# Patient Record
Sex: FEMALE | HISPANIC OR LATINO | Employment: FULL TIME | URBAN - METROPOLITAN AREA
[De-identification: names, ages, dates, MRNs, and addresses within clinical notes are randomized per-mention and may not be internally consistent; named-entity substitution may affect disease eponyms.]

---

## 2017-10-27 ENCOUNTER — NON-PROVIDER VISIT (OUTPATIENT)
Dept: OCCUPATIONAL MEDICINE | Facility: CLINIC | Age: 22
End: 2017-10-27

## 2017-10-27 DIAGNOSIS — Z23 NEED FOR VACCINATION: ICD-10-CM

## 2017-10-27 PROCEDURE — 90746 HEPB VACCINE 3 DOSE ADULT IM: CPT | Performed by: PREVENTIVE MEDICINE

## 2017-12-19 ENCOUNTER — HOSPITAL ENCOUNTER (OUTPATIENT)
Facility: MEDICAL CENTER | Age: 22
End: 2017-12-19
Attending: FAMILY MEDICINE
Payer: COMMERCIAL

## 2017-12-19 ENCOUNTER — OFFICE VISIT (OUTPATIENT)
Dept: URGENT CARE | Facility: PHYSICIAN GROUP | Age: 22
End: 2017-12-19
Payer: COMMERCIAL

## 2017-12-19 VITALS
HEART RATE: 73 BPM | RESPIRATION RATE: 16 BRPM | HEIGHT: 67 IN | DIASTOLIC BLOOD PRESSURE: 82 MMHG | BODY MASS INDEX: 26.37 KG/M2 | SYSTOLIC BLOOD PRESSURE: 122 MMHG | WEIGHT: 168 LBS | OXYGEN SATURATION: 100 % | TEMPERATURE: 98.7 F

## 2017-12-19 DIAGNOSIS — R10.9 ABDOMINAL PAIN, UNSPECIFIED ABDOMINAL LOCATION: ICD-10-CM

## 2017-12-19 DIAGNOSIS — N39.0 COMPLICATED UTI (URINARY TRACT INFECTION): ICD-10-CM

## 2017-12-19 LAB
APPEARANCE UR: CLEAR
BILIRUB UR STRIP-MCNC: NORMAL MG/DL
COLOR UR AUTO: YELLOW
GLUCOSE UR STRIP.AUTO-MCNC: NORMAL MG/DL
INT CON NEG: NEGATIVE
INT CON POS: POSITIVE
KETONES UR STRIP.AUTO-MCNC: NORMAL MG/DL
LEUKOCYTE ESTERASE UR QL STRIP.AUTO: NORMAL
NITRITE UR QL STRIP.AUTO: NORMAL
PH UR STRIP.AUTO: 6.5 [PH] (ref 5–8)
POC URINE PREGNANCY TEST: NORMAL
PROT UR QL STRIP: NORMAL MG/DL
RBC UR QL AUTO: NORMAL
SP GR UR STRIP.AUTO: 1.01
UROBILINOGEN UR STRIP-MCNC: NORMAL MG/DL

## 2017-12-19 PROCEDURE — 87186 SC STD MICRODIL/AGAR DIL: CPT

## 2017-12-19 PROCEDURE — 81025 URINE PREGNANCY TEST: CPT | Performed by: FAMILY MEDICINE

## 2017-12-19 PROCEDURE — 81002 URINALYSIS NONAUTO W/O SCOPE: CPT | Performed by: FAMILY MEDICINE

## 2017-12-19 PROCEDURE — 87086 URINE CULTURE/COLONY COUNT: CPT

## 2017-12-19 PROCEDURE — 87077 CULTURE AEROBIC IDENTIFY: CPT

## 2017-12-19 PROCEDURE — 99204 OFFICE O/P NEW MOD 45 MIN: CPT | Performed by: FAMILY MEDICINE

## 2017-12-19 RX ORDER — CIPROFLOXACIN 500 MG/1
500 TABLET, FILM COATED ORAL EVERY 12 HOURS
Qty: 14 TAB | Refills: 0 | Status: SHIPPED | OUTPATIENT
Start: 2017-12-19 | End: 2017-12-26

## 2017-12-19 ASSESSMENT — ENCOUNTER SYMPTOMS
SORE THROAT: 0
MYALGIAS: 1
ABDOMINAL PAIN: 1
SHORTNESS OF BREATH: 0
DIZZINESS: 0
NAUSEA: 1
PSYCHIATRIC NEGATIVE: 1
EYE REDNESS: 0
COUGH: 0
CHILLS: 1
FEVER: 0
BRUISES/BLEEDS EASILY: 0
VOMITING: 0

## 2017-12-20 DIAGNOSIS — N39.0 COMPLICATED UTI (URINARY TRACT INFECTION): ICD-10-CM

## 2017-12-20 NOTE — PROGRESS NOTES
Subjective:      Tanisha Cole is a 22 y.o. female who presents with Abdominal Pain (pain in center of abd x1 month)  Patient presents urgent care with suprapubic abdominal pain for the past month off and on. She's had dysuria urgency frequency. That has been off and on. Denies any hematuria. She's had some nausea some malaise and body aches and some chills no obvious fevers. No history of kidney infection or kidney stone. No vaginal discharge. She's had some right low back and flank region pain.      HPI  Review of Systems   Constitutional: Positive for chills and malaise/fatigue. Negative for fever.   HENT: Negative for sore throat.    Eyes: Negative for redness.   Respiratory: Negative for cough and shortness of breath.    Cardiovascular: Negative for chest pain.   Gastrointestinal: Positive for abdominal pain and nausea. Negative for vomiting.   Genitourinary: Positive for dysuria, frequency and urgency.   Musculoskeletal: Positive for myalgias.   Neurological: Negative for dizziness.   Endo/Heme/Allergies: Does not bruise/bleed easily.   Psychiatric/Behavioral: Negative.      PMH:  has no past medical history of Addisons disease (CMS-HCC); Adrenal disorder (CMS-HCC); Allergy; Anemia; Anxiety; Arrhythmia; Arthritis; ASTHMA; Blood transfusion; Cancer (CMS-HCC); CATARACT; CHF (congestive heart failure) (CMS-HCC); Clotting disorder (CMS-HCC); COPD; Cushings syndrome (CMS-HCC); Depression; Diabetes; Diabetic neuropathy (CMS-HCC); EMPHYSEMA; GERD (gastroesophageal reflux disease); Glaucoma; Goiter; Headache(784.0); Heart attack; Heart murmur; HIV (human immunodeficiency virus infection); Hyperlipidemia; Hypertension; IBD (inflammatory bowel disease); Kidney disease; Meningitis; Migraine; Muscle disorder; OSTEOPOROSIS; Parathyroid disorder (CMS-HCC); Pituitary disease (CMS-HCC); Seizure (CMS-HCC); Sickle cell disease (CMS-HCC); Stroke (CMS-HCC); Substance abuse; Thyroid disease; Tuberculosis; Ulcer (CMS-HCC); or  "Urinary tract infection, site not specified.  MEDS:   Current Outpatient Prescriptions:   •  ciprofloxacin (CIPRO) 500 MG Tab, Take 1 Tab by mouth every 12 hours for 7 days., Disp: 14 Tab, Rfl: 0  •  norgestimate-ethinyl estradiol (ORTHO-CYCLEN) 0.25-35 MG-MCG per tablet, Take 1 Tab by mouth every day., Disp: 28 Tab, Rfl: 11  •  Prenatal MV-Min-Fe Fum-FA-DHA (PRENATAL 1 PO), Take  by mouth., Disp: , Rfl:   ALLERGIES:   Allergies   Allergen Reactions   • Penicillins Rash     rash   SURGHX: History reviewed. No pertinent surgical history.  SOCHX:  reports that she has never smoked. She has never used smokeless tobacco. She reports that she does not drink alcohol or use drugs.  FH: Family history was reviewed, no pertinent findings to report     Objective:     /82   Pulse 73   Temp 37.1 °C (98.7 °F)   Resp 16   Ht 1.702 m (5' 7\")   Wt 76.2 kg (168 lb)   LMP 11/28/2017   SpO2 100%   Breastfeeding? No   BMI 26.31 kg/m²      Physical Exam   Constitutional: She appears well-developed and well-nourished. No distress.   HENT:   Mouth/Throat: Oropharynx is clear and moist.   Eyes: Conjunctivae are normal.   Neck: Normal range of motion.   Cardiovascular: Normal rate, regular rhythm, normal heart sounds and intact distal pulses.  Exam reveals no gallop and no friction rub.    No murmur heard.  Pulmonary/Chest: Effort normal and breath sounds normal. No respiratory distress. She has no wheezes. She has no rales. She exhibits no tenderness.   Abdominal: Soft. There is CVA tenderness.   Musculoskeletal: Normal range of motion.        Arms:  Neurological: She is alert.   Skin: Skin is warm and dry. She is not diaphoretic.   Psychiatric: She has a normal mood and affect. Her behavior is normal.        Diagnostics: Urine hCG is negative urine dip shows RBCs and WBCs       Assessment/Plan:     1. Abdominal pain, unspecified abdominal location  POCT Urinalysis    POCT PREGNANCY   2. Complicated UTI (urinary tract " infection)  ciprofloxacin (CIPRO) 500 MG Tab    Urine Culture     Follow-up with primary care provider within 4-5 days, emergency room precautions discussed.  Patient and/or family appears understanding of information.

## 2017-12-22 LAB
BACTERIA UR CULT: ABNORMAL
BACTERIA UR CULT: ABNORMAL
SIGNIFICANT IND 70042: ABNORMAL
SITE SITE: ABNORMAL
SOURCE SOURCE: ABNORMAL

## 2020-08-23 ENCOUNTER — APPOINTMENT (OUTPATIENT)
Dept: RADIOLOGY | Facility: IMAGING CENTER | Age: 25
End: 2020-08-23
Attending: FAMILY MEDICINE
Payer: MEDICAID

## 2020-08-23 ENCOUNTER — OFFICE VISIT (OUTPATIENT)
Dept: URGENT CARE | Facility: CLINIC | Age: 25
End: 2020-08-23
Payer: MEDICAID

## 2020-08-23 VITALS
OXYGEN SATURATION: 98 % | SYSTOLIC BLOOD PRESSURE: 112 MMHG | HEART RATE: 104 BPM | DIASTOLIC BLOOD PRESSURE: 76 MMHG | RESPIRATION RATE: 15 BRPM | TEMPERATURE: 96.7 F

## 2020-08-23 DIAGNOSIS — S93.401A SPRAIN OF RIGHT ANKLE, UNSPECIFIED LIGAMENT, INITIAL ENCOUNTER: ICD-10-CM

## 2020-08-23 PROCEDURE — 73610 X-RAY EXAM OF ANKLE: CPT | Mod: TC,RT | Performed by: FAMILY MEDICINE

## 2020-08-23 PROCEDURE — 99214 OFFICE O/P EST MOD 30 MIN: CPT | Performed by: FAMILY MEDICINE

## 2020-08-24 NOTE — PROGRESS NOTES
Subjective:      Tanisha Cole is a 24 y.o. female who presents with Ankle Injury (right ankle )    - This is a pleasant and non toxic appearing 24 y.o. female with c/o pain Rt ankle x ~1 wk. Twisted/rolled it whilst on a trampoline. Worse walking, better rest            ALLERGIES:  Penicillins     PMH:  History reviewed. No pertinent past medical history.     PSH:  History reviewed. No pertinent surgical history.    MEDS:  No current outpatient medications on file.    ** I have documented what I find to be significant in regards to past medical, social, family and surgical history  in my HPI or under PMH/PSH/FH review section, otherwise it is contributory **             HPI    Review of Systems   Musculoskeletal: Positive for joint pain.   All other systems reviewed and are negative.         Objective:     /76   Pulse (!) 104   Temp 35.9 °C (96.7 °F) (Temporal)   Resp 15   SpO2 98%      Physical Exam  Vitals signs and nursing note reviewed.   Constitutional:       General: She is not in acute distress.     Appearance: She is well-developed. She is not diaphoretic.   HENT:      Head: Normocephalic and atraumatic.   Eyes:      General: No scleral icterus.     Conjunctiva/sclera: Conjunctivae normal.   Cardiovascular:      Heart sounds: Normal heart sounds. No murmur.   Pulmonary:      Effort: Pulmonary effort is normal. No respiratory distress.   Musculoskeletal: Normal range of motion.         General: Swelling, tenderness and signs of injury present. No deformity.        Feet:    Skin:     Coloration: Skin is not pale.      Findings: No rash.   Neurological:      Mental Status: She is alert.      Motor: No abnormal muscle tone.   Psychiatric:         Mood and Affect: Mood normal.         Behavior: Behavior normal.         Judgment: Judgment normal.                 Assessment/Plan:            1. Sprain of right ankle, unspecified ligament, initial encounter  DX-ANKLE 3+ VIEWS RIGHT       - RICE/Motrin  -  E.R. precautions discussed     Dx & d/c instructions discussed w/ patient and/or family members.     Follow up with PCP (or UC if PCP is unavailable) in 7 days to make sure improving and no further additional treatment needed, ER if not improving or feeling/getting worse.

## 2020-09-24 ENCOUNTER — GYNECOLOGY VISIT (OUTPATIENT)
Dept: OBGYN | Facility: CLINIC | Age: 25
End: 2020-09-24
Payer: MEDICAID

## 2020-09-24 VITALS — BODY MASS INDEX: 31.86 KG/M2 | WEIGHT: 203.4 LBS | SYSTOLIC BLOOD PRESSURE: 108 MMHG | DIASTOLIC BLOOD PRESSURE: 62 MMHG

## 2020-09-24 DIAGNOSIS — N93.9 ABNORMAL UTERINE BLEEDING (AUB): ICD-10-CM

## 2020-09-24 DIAGNOSIS — N92.6 MENSES, IRREGULAR: ICD-10-CM

## 2020-09-24 DIAGNOSIS — Z12.4 CERVICAL CANCER SCREENING: ICD-10-CM

## 2020-09-24 LAB
INT CON NEG: NEGATIVE
INT CON POS: POSITIVE
POC URINE PREGNANCY TEST: NEGATIVE

## 2020-09-24 PROCEDURE — 81025 URINE PREGNANCY TEST: CPT | Performed by: OBSTETRICS & GYNECOLOGY

## 2020-09-24 PROCEDURE — 99203 OFFICE O/P NEW LOW 30 MIN: CPT | Performed by: OBSTETRICS & GYNECOLOGY

## 2020-09-24 RX ORDER — MEDROXYPROGESTERONE ACETATE 10 MG/1
10 TABLET ORAL DAILY
Qty: 10 TAB | Refills: 0 | Status: SHIPPED | OUTPATIENT
Start: 2020-09-24 | End: 2024-03-06

## 2020-09-24 SDOH — HEALTH STABILITY: MENTAL HEALTH: HOW OFTEN DO YOU HAVE A DRINK CONTAINING ALCOHOL?: MONTHLY OR LESS

## 2020-09-24 NOTE — PROGRESS NOTES
GYN Visit    CC: irregular periods    HPI: Tanisha Cole is a 24 y.o.  with irregular menses.      Patient reports that her last period was in late 2020.  Was on the pill for 3 months, and has essentially not had a period since then.  patient reports that she did have will monitor every month prior to being on the pill (was only for 3 mos) and reports they were slightly irregular but every month.  Hasn't gone more than a month without a cycle until now since she was a teenager.     Reports increasing acne recently. No hair growth.  Not BFing, no nipple discharge.   Desires pregnancy, patient reports that she needed to be on something to get pregnant in the past.  Not sure what this was, but states it was not something she took for a few days, but something that she took daily for several months.    Denies recent weight gain, reports she has lost some weight over the past few years.    ROS:  12 system review of systems performed and negative except as above, see intake form for details.    OB History    Para Term  AB Living   1 1 1     1   SAB TAB Ectopic Molar Multiple Live Births             1      # Outcome Date GA Lbr Jeremy/2nd Weight Sex Delivery Anes PTL Lv   1 Term 14 40w4d  3.515 kg (7 lb 12 oz) F Vag-Spont None  ILA      Birth Comments: Pt states no complications       GYN Hx:   Menarche age 14  Denies hx of STIs  Denies hx of abnl paps, last pap several years ago    Past medical history: Denies    Past surgical history: Denies     meds: None    Allergies: Penicillins    Social History     Tobacco Use   • Smoking status: Never Smoker   • Smokeless tobacco: Never Used   Substance Use Topics   • Alcohol use: Yes     Frequency: Monthly or less   • Drug use: No         Family History   Problem Relation Age of Onset   • Hypertension Father    • No Known Problems Sister    • No Known Problems Brother    • Breast Cancer Paternal Grandmother    • Breast Cancer Maternal Aunt 32   •  Hyperlipidemia Neg Hx    • Heart Disease Neg Hx    • Diabetes Neg Hx    • Psychiatric Illness Neg Hx    • Genetic Disorder Neg Hx      Patient reports a possible history of ovarian cancer in a maternal aunt, thinks that that person had surgery only, no chemotherapy and is still alive.    Physical Exam:  /62   Wt 92.3 kg (203 lb 6.4 oz)   BMI 31.86 kg/m²   gen: AAO, NAD, affect appropriate  CV: RRR; no LE edema  resp: ctab  abd: soft, NT, ND, no masses, no organomegaly, no hernias  : NEFG, normal urethral meatus, normal anus/perineum, normal vagina and cervix.  Uterus 8wk sized, anteverted, no adnexal masses/tenderness  Skin: warm/dry, no lesions    UPT negative    A/P: 24 y.o.  with abnormal uterine bleeding  1. Menses, irregular  POCT Pregnancy   2. Cervical cancer screening  THINPREP RFLX HPV ASCUS W/CTNG   3. Abnormal uterine bleeding (AUB)  17-OH PROGESTERONE    DHEA SULFATE    FSH    PROLACTIN    TESTOSTERONE F&T FEMALES/CHILD    TSH WITH REFLEX TO FT4    US-PELVIC COMPLETE (TRANSABDOMINAL/TRANSVAGINAL) (COMBO)     Pap collected today    Discussed polycystic ovarian syndrome is the most common etiology of abnormal uterine bleeding/missed menses.  Need to evaluate the for other endocrine etiologies as well, endocrine labs ordered as above as well as pelvic ultrasound.  Patient to return after labs and ultrasound for discussion of results.  Recommend 10-day course of progesterone to induce withdrawal bleed, prescription sent to pharmacy.  After discussion of results will discuss further long-term protection of endometrium.  Will have low threshold for endometrial biopsy, although patient does report this is the first time that she has not had a monthly menstrual cycle.    Recommend to start folic acid daily for desired pregnancy    Return to clinic 3 to 4 weeks after labs and ultrasound as above    Consuelo Meneses MD  [facility], Women's Health

## 2020-09-24 NOTE — LETTER
September 24, 2020         Patient: Tanisha Cole   YOB: 1995   Date of Visit: 9/24/2020           To Whom it May Concern:    Tanisha Cole was seen in my clinic on 9/24/2020. If you have any questions or concerns, please don't hesitate to call.        Sincerely,           Consuelo Meneses M.D.  Electronically Signed

## 2020-09-24 NOTE — NON-PROVIDER
Patient here c/o pt is here because she has not had a period since May.  LMP= 05/24/2020  BCM: none  Last pap date/result: unknown  Last mammogram if applicable  Phone number: [phone]  Pharmacy confirmed.

## 2020-09-29 DIAGNOSIS — Z12.4 CERVICAL CANCER SCREENING: ICD-10-CM

## 2020-10-13 ENCOUNTER — HOSPITAL ENCOUNTER (OUTPATIENT)
Facility: MEDICAL CENTER | Age: 25
End: 2020-10-13
Attending: PHYSICIAN ASSISTANT
Payer: MEDICAID

## 2020-10-13 ENCOUNTER — OFFICE VISIT (OUTPATIENT)
Dept: URGENT CARE | Facility: CLINIC | Age: 25
End: 2020-10-13
Payer: MEDICAID

## 2020-10-13 VITALS
SYSTOLIC BLOOD PRESSURE: 104 MMHG | TEMPERATURE: 97.6 F | HEIGHT: 67 IN | WEIGHT: 197 LBS | BODY MASS INDEX: 30.92 KG/M2 | OXYGEN SATURATION: 98 % | HEART RATE: 76 BPM | RESPIRATION RATE: 16 BRPM | DIASTOLIC BLOOD PRESSURE: 68 MMHG

## 2020-10-13 DIAGNOSIS — Z20.822 SUSPECTED COVID-19 VIRUS INFECTION: ICD-10-CM

## 2020-10-13 LAB
COVID ORDER STATUS COVID19: NORMAL
FORWARD REASON: SPWHY: NORMAL
FORWARDED TO LAB: SPWHR: NORMAL
SARS-COV-2 RNA RESP QL NAA+PROBE: DETECTED
SPECIMEN SENT: SPWT1: NORMAL
SPECIMEN SOURCE: ABNORMAL

## 2020-10-13 PROCEDURE — U0003 INFECTIOUS AGENT DETECTION BY NUCLEIC ACID (DNA OR RNA); SEVERE ACUTE RESPIRATORY SYNDROME CORONAVIRUS 2 (SARS-COV-2) (CORONAVIRUS DISEASE [COVID-19]), AMPLIFIED PROBE TECHNIQUE, MAKING USE OF HIGH THROUGHPUT TECHNOLOGIES AS DESCRIBED BY CMS-2020-01-R: HCPCS

## 2020-10-13 PROCEDURE — C9803 HOPD COVID-19 SPEC COLLECT: HCPCS

## 2020-10-13 PROCEDURE — 99214 OFFICE O/P EST MOD 30 MIN: CPT | Mod: CS | Performed by: PHYSICIAN ASSISTANT

## 2020-10-13 ASSESSMENT — ENCOUNTER SYMPTOMS
NAUSEA: 0
FEVER: 0
COUGH: 0
VOMITING: 0
ABDOMINAL PAIN: 0
FATIGUE: 0
HEADACHES: 0
SWOLLEN GLANDS: 0
MYALGIAS: 0
NECK PAIN: 0
SORE THROAT: 0

## 2020-10-13 NOTE — PROGRESS NOTES
Subjective:      Tanisha Cole is a 24 y.o. female who presents with Other (loss of taste and loss of smell since yesterday )            Loss of taste and smell yesterday.  Otherwise asymptomatic denying cough, shortness of breath, chest pain.  No sick contacts.  No history of asthma or pneumonia.    Other  This is a new problem. The current episode started yesterday. The problem occurs constantly. The problem has been unchanged. Pertinent negatives include no abdominal pain, congestion, coughing, fatigue, fever, headaches, myalgias, nausea, neck pain, rash, sore throat, swollen glands or vomiting. Nothing aggravates the symptoms. She has tried nothing for the symptoms. The treatment provided no relief.       PMH:  has no past medical history of Addisons disease ([facility]), Adrenal disorder ([facility]), Allergy, Allergy, Anemia, Anxiety, Arrhythmia, Arthritis, ASTHMA, Asthma, Blood transfusion, Blood transfusion without reported diagnosis, Cancer ([facility]), CATARACT, Cataract, CHF (congestive heart failure) ([facility]), Clotting disorder ([facility]), COPD, COPD (chronic obstructive pulmonary disease) ([facility]), Cushings syndrome ([facility]), Depression, Diabetes, Diabetes ([facility]), Diabetic neuropathy ([facility]), EMPHYSEMA, GERD (gastroesophageal reflux disease), Glaucoma, Goiter, Head ache, Headache(784.0), Heart attack ([facility]), Heart murmur, HIV (human immunodeficiency virus infection), HIV (human immunodeficiency virus infection) ([facility]), Hyperlipidemia, Hypertension, IBD (inflammatory bowel disease), Kidney disease, Meningitis, Migraine, Migraine, Muscle disorder, OSTEOPOROSIS, Osteoporosis, Parathyroid disorder ([facility]), Pituitary disease ([facility]), Pulmonary emphysema ([facility]), Seizure ([facility]), Sickle cell disease ([facility]), Stroke ([facility]), Substance abuse ([facility]), Thyroid disease, Tuberculosis, Ulcer, Urinary tract infection, or Urinary tract infection, site not specified.  MEDS:   Current Outpatient Medications:   •  medroxyPROGESTERone (PROVERA) 10 MG Tab, Take 1 Tab by  "mouth every day. (Patient not taking: Reported on 10/13/2020), Disp: 10 Tab, Rfl: 0  ALLERGIES:   Allergies   Allergen Reactions   • Penicillins Rash     rash     SURGHX: No past surgical history on file.  SOCHX:  reports that she has never smoked. She has never used smokeless tobacco. She reports previous alcohol use. She reports that she does not use drugs.  FH: family history includes Breast Cancer in her paternal grandmother; Breast Cancer (age of onset: 32) in her maternal aunt; Hypertension in her father; No Known Problems in her brother and sister.      Review of Systems   Constitutional: Negative for fatigue and fever.   HENT: Negative for congestion and sore throat.    Respiratory: Negative for cough.    Gastrointestinal: Negative for abdominal pain, nausea and vomiting.   Musculoskeletal: Negative for myalgias and neck pain.   Skin: Negative for rash.   Neurological: Negative for headaches.   All other systems reviewed and are negative.      Medications, Allergies, and current problem list reviewed today in Epic     Objective:     /68 (BP Location: Left arm, Patient Position: Sitting, BP Cuff Size: Adult)   Pulse 76   Temp 36.4 °C (97.6 °F) (Temporal)   Resp 16   Ht 1.702 m (5' 7\")   Wt 89.4 kg (197 lb)   LMP 10/06/2020 (Approximate)   SpO2 98%   Breastfeeding No   BMI 30.85 kg/m²      Physical Exam  Vitals signs and nursing note reviewed.   Constitutional:       General: She is not in acute distress.     Appearance: She is well-developed. She is not diaphoretic.   HENT:      Head: Normocephalic and atraumatic.      Right Ear: Tympanic membrane, ear canal and external ear normal.      Left Ear: Tympanic membrane, ear canal and external ear normal.      Nose: Nose normal. No congestion or rhinorrhea.      Mouth/Throat:      Pharynx: No oropharyngeal exudate or posterior oropharyngeal erythema.   Eyes:      General:         Right eye: No discharge.         Left eye: No discharge.      " Conjunctiva/sclera: Conjunctivae normal.   Neck:      Musculoskeletal: Normal range of motion and neck supple.   Cardiovascular:      Rate and Rhythm: Normal rate and regular rhythm.      Heart sounds: Normal heart sounds.   Pulmonary:      Effort: Pulmonary effort is normal. No respiratory distress.      Breath sounds: Normal breath sounds. No wheezing, rhonchi or rales.   Musculoskeletal: Normal range of motion.   Lymphadenopathy:      Cervical: No cervical adenopathy.   Skin:     General: Skin is warm and dry.   Neurological:      Mental Status: She is alert and oriented to person, place, and time.   Psychiatric:         Behavior: Behavior normal.         Thought Content: Thought content normal.         Judgment: Judgment normal.                 Assessment/Plan:         1. Suspected COVID-19 virus infection  COVID/SARS COV-2 PCR     Loss of taste and smell.  Otherwise asymptomatic.  Vital signs normal, PO2 adequate, exam unremarkable.  No history of asthma or pneumonia.  No known exposure to COVID-19.  Covid testing initiated.  Isolation and quarantine discussed.  Patient does have [employer] ins. therefore her testing will take approximately 7 to 10 days to return.  Did provide the number to the health department where she may receive expedited testing.    Return to clinic or go to ED if symptoms worsen or persist. Indications for ED discussed at length. Patient voices understanding. Follow-up with your primary care provider in 3-5 days. Red flags discussed. All side effects of medication discussed including allergic response, GI upset, tendon injury, etc.    Please note that this dictation was created using voice recognition software. I have made every reasonable attempt to correct obvious errors, but I expect that there are errors of grammar and possibly content that I did not discover before finalizing the note.

## 2021-01-26 ENCOUNTER — APPOINTMENT (OUTPATIENT)
Dept: URGENT CARE | Facility: CLINIC | Age: 26
End: 2021-01-26
Payer: MEDICAID

## 2022-03-26 ENCOUNTER — APPOINTMENT (OUTPATIENT)
Dept: RADIOLOGY | Facility: MEDICAL CENTER | Age: 27
End: 2022-03-26
Payer: MEDICAID

## 2022-03-26 ENCOUNTER — HOSPITAL ENCOUNTER (EMERGENCY)
Facility: MEDICAL CENTER | Age: 27
End: 2022-03-26
Attending: EMERGENCY MEDICINE
Payer: MEDICAID

## 2022-03-26 ENCOUNTER — APPOINTMENT (OUTPATIENT)
Dept: RADIOLOGY | Facility: MEDICAL CENTER | Age: 27
End: 2022-03-26
Attending: EMERGENCY MEDICINE
Payer: MEDICAID

## 2022-03-26 VITALS
BODY MASS INDEX: 29.03 KG/M2 | OXYGEN SATURATION: 96 % | HEIGHT: 67 IN | RESPIRATION RATE: 20 BRPM | SYSTOLIC BLOOD PRESSURE: 121 MMHG | TEMPERATURE: 97 F | DIASTOLIC BLOOD PRESSURE: 84 MMHG | HEART RATE: 75 BPM | WEIGHT: 185 LBS

## 2022-03-26 DIAGNOSIS — S16.1XXA STRAIN OF NECK MUSCLE, INITIAL ENCOUNTER: ICD-10-CM
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
## 2022-03-26 LAB
ABO GROUP BLD: NORMAL
ALBUMIN SERPL BCP-MCNC: 4.4 G/DL (ref 3.2–4.9)
ALBUMIN/GLOB SERPL: 1.4 G/DL
ALP SERPL-CCNC: 63 U/L (ref 30–99)
ALT SERPL-CCNC: 16 U/L (ref 2–50)
ANION GAP SERPL CALC-SCNC: 12 MMOL/L (ref 7–16)
APTT PPP: 29.5 SEC (ref 24.7–36)
AST SERPL-CCNC: 24 U/L (ref 12–45)
BILIRUB SERPL-MCNC: 0.6 MG/DL (ref 0.1–1.5)
BLD GP AB SCN SERPL QL: NORMAL
BUN SERPL-MCNC: 13 MG/DL (ref 8–22)
CALCIUM SERPL-MCNC: 9.6 MG/DL (ref 8.5–10.5)
CHLORIDE SERPL-SCNC: 102 MMOL/L (ref 96–112)
CO2 SERPL-SCNC: 23 MMOL/L (ref 20–33)
CREAT SERPL-MCNC: 0.51 MG/DL (ref 0.5–1.4)
ERYTHROCYTE [DISTWIDTH] IN BLOOD BY AUTOMATED COUNT: 40.9 FL (ref 35.9–50)
ETHANOL BLD-MCNC: <10.1 MG/DL (ref 0–10)
GFR SERPLBLD CREATININE-BSD FMLA CKD-EPI: 131 ML/MIN/1.73 M 2
GLOBULIN SER CALC-MCNC: 3.2 G/DL (ref 1.9–3.5)
GLUCOSE SERPL-MCNC: 114 MG/DL (ref 65–99)
HCG SERPL QL: NEGATIVE
HCT VFR BLD AUTO: 39.8 % (ref 37–47)
HGB BLD-MCNC: 13.1 G/DL (ref 12–16)
INR PPP: 1.01 (ref 0.87–1.13)
MCH RBC QN AUTO: 27 PG (ref 27–33)
MCHC RBC AUTO-ENTMCNC: 32.9 G/DL (ref 33.6–35)
MCV RBC AUTO: 81.9 FL (ref 81.4–97.8)
PLATELET # BLD AUTO: 232 K/UL (ref 164–446)
PMV BLD AUTO: 10.9 FL (ref 9–12.9)
POTASSIUM SERPL-SCNC: 3.7 MMOL/L (ref 3.6–5.5)
PROT SERPL-MCNC: 7.6 G/DL (ref 6–8.2)
PROTHROMBIN TIME: 13 SEC (ref 12–14.6)
RBC # BLD AUTO: 4.86 M/UL (ref 4.2–5.4)
RH BLD: NORMAL
SODIUM SERPL-SCNC: 137 MMOL/L (ref 135–145)
WBC # BLD AUTO: 7.9 K/UL (ref 4.8–10.8)

## 2022-03-26 PROCEDURE — 80053 COMPREHEN METABOLIC PANEL: CPT

## 2022-03-26 PROCEDURE — 86850 RBC ANTIBODY SCREEN: CPT

## 2022-03-26 PROCEDURE — 72125 CT NECK SPINE W/O DYE: CPT

## 2022-03-26 PROCEDURE — 85610 PROTHROMBIN TIME: CPT

## 2022-03-26 PROCEDURE — 305948 HCHG GREEN TRAUMA ACT PRE-NOTIFY NO CC

## 2022-03-26 PROCEDURE — 72131 CT LUMBAR SPINE W/O DYE: CPT

## 2022-03-26 PROCEDURE — 72128 CT CHEST SPINE W/O DYE: CPT

## 2022-03-26 PROCEDURE — 71260 CT THORAX DX C+: CPT

## 2022-03-26 PROCEDURE — 71045 X-RAY EXAM CHEST 1 VIEW: CPT

## 2022-03-26 PROCEDURE — 85730 THROMBOPLASTIN TIME PARTIAL: CPT

## 2022-03-26 PROCEDURE — 86901 BLOOD TYPING SEROLOGIC RH(D): CPT

## 2022-03-26 PROCEDURE — 84703 CHORIONIC GONADOTROPIN ASSAY: CPT

## 2022-03-26 PROCEDURE — 82077 ASSAY SPEC XCP UR&BREATH IA: CPT

## 2022-03-26 PROCEDURE — 700117 HCHG RX CONTRAST REV CODE 255: Performed by: EMERGENCY MEDICINE

## 2022-03-26 PROCEDURE — 99284 EMERGENCY DEPT VISIT MOD MDM: CPT

## 2022-03-26 PROCEDURE — 70450 CT HEAD/BRAIN W/O DYE: CPT

## 2022-03-26 PROCEDURE — 86900 BLOOD TYPING SEROLOGIC ABO: CPT

## 2022-03-26 PROCEDURE — 85027 COMPLETE CBC AUTOMATED: CPT

## 2022-03-26 RX ADMIN — IOHEXOL 100 ML: 350 INJECTION, SOLUTION INTRAVENOUS at 20:29

## 2022-03-26 NOTE — Clinical Note
Tanisha Cole was seen and treated in our emergency department on 3/20/2022.  She may return to work on 03/29/2022.       If you have any questions or concerns, please don't hesitate to call.      Kaiden Jarquin M.D.
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
## 2022-03-27 NOTE — ED NOTES
Pt ambulatory with steady gait to BR. Discharge teaching and paperwork provided regarding neck muscle strain and all questions/concerns answered. VSS,  assessment stable and PIV removed. Given information regarding home care and reasons to follow up with ED or primary MD.

## 2024-02-27 ENCOUNTER — APPOINTMENT (OUTPATIENT)
Dept: ADMISSIONS | Facility: MEDICAL CENTER | Age: 29
End: 2024-02-27
Attending: OBSTETRICS & GYNECOLOGY
Payer: MEDICAID

## 2024-03-06 ENCOUNTER — PRE-ADMISSION TESTING (OUTPATIENT)
Dept: ADMISSIONS | Facility: MEDICAL CENTER | Age: 29
End: 2024-03-06
Attending: OBSTETRICS & GYNECOLOGY
Payer: MEDICAID

## 2024-03-14 ENCOUNTER — PRE-ADMISSION TESTING (OUTPATIENT)
Dept: ADMISSIONS | Facility: MEDICAL CENTER | Age: 29
End: 2024-03-14
Attending: OBSTETRICS & GYNECOLOGY
Payer: MEDICAID

## 2024-03-14 ENCOUNTER — ANESTHESIA EVENT (OUTPATIENT)
Dept: SURGERY | Facility: MEDICAL CENTER | Age: 29
End: 2024-03-14
Payer: MEDICAID

## 2024-03-14 DIAGNOSIS — Z01.812 PRE-OPERATIVE LABORATORY EXAMINATION: ICD-10-CM

## 2024-03-14 DIAGNOSIS — Z01.810 PRE-OPERATIVE CARDIOVASCULAR EXAMINATION: ICD-10-CM

## 2024-03-14 LAB
ANION GAP SERPL CALC-SCNC: 11 MMOL/L (ref 7–16)
BASOPHILS # BLD AUTO: 0.3 % (ref 0–1.8)
BASOPHILS # BLD: 0.02 K/UL (ref 0–0.12)
BUN SERPL-MCNC: 10 MG/DL (ref 8–22)
CALCIUM SERPL-MCNC: 9.2 MG/DL (ref 8.5–10.5)
CHLORIDE SERPL-SCNC: 102 MMOL/L (ref 96–112)
CO2 SERPL-SCNC: 24 MMOL/L (ref 20–33)
CREAT SERPL-MCNC: 0.62 MG/DL (ref 0.5–1.4)
EKG IMPRESSION: NORMAL
EOSINOPHIL # BLD AUTO: 0.09 K/UL (ref 0–0.51)
EOSINOPHIL NFR BLD: 1.4 % (ref 0–6.9)
ERYTHROCYTE [DISTWIDTH] IN BLOOD BY AUTOMATED COUNT: 38.4 FL (ref 35.9–50)
GFR SERPLBLD CREATININE-BSD FMLA CKD-EPI: 123 ML/MIN/1.73 M 2
GLUCOSE SERPL-MCNC: 91 MG/DL (ref 65–99)
HCG SERPL QL: NEGATIVE
HCT VFR BLD AUTO: 37.2 % (ref 37–47)
HGB BLD-MCNC: 12.4 G/DL (ref 12–16)
IMM GRANULOCYTES # BLD AUTO: 0.01 K/UL (ref 0–0.11)
IMM GRANULOCYTES NFR BLD AUTO: 0.2 % (ref 0–0.9)
LYMPHOCYTES # BLD AUTO: 2.52 K/UL (ref 1–4.8)
LYMPHOCYTES NFR BLD: 39.4 % (ref 22–41)
MCH RBC QN AUTO: 26.8 PG (ref 27–33)
MCHC RBC AUTO-ENTMCNC: 33.3 G/DL (ref 32.2–35.5)
MCV RBC AUTO: 80.5 FL (ref 81.4–97.8)
MONOCYTES # BLD AUTO: 0.39 K/UL (ref 0–0.85)
MONOCYTES NFR BLD AUTO: 6.1 % (ref 0–13.4)
NEUTROPHILS # BLD AUTO: 3.37 K/UL (ref 1.82–7.42)
NEUTROPHILS NFR BLD: 52.6 % (ref 44–72)
NRBC # BLD AUTO: 0 K/UL
NRBC BLD-RTO: 0 /100 WBC (ref 0–0.2)
PLATELET # BLD AUTO: 234 K/UL (ref 164–446)
PMV BLD AUTO: 11.8 FL (ref 9–12.9)
POTASSIUM SERPL-SCNC: 3.7 MMOL/L (ref 3.6–5.5)
RBC # BLD AUTO: 4.62 M/UL (ref 4.2–5.4)
SODIUM SERPL-SCNC: 137 MMOL/L (ref 135–145)
WBC # BLD AUTO: 6.4 K/UL (ref 4.8–10.8)

## 2024-03-14 PROCEDURE — 85025 COMPLETE CBC W/AUTO DIFF WBC: CPT

## 2024-03-14 PROCEDURE — 93010 ELECTROCARDIOGRAM REPORT: CPT | Performed by: INTERNAL MEDICINE

## 2024-03-14 PROCEDURE — 84703 CHORIONIC GONADOTROPIN ASSAY: CPT

## 2024-03-14 PROCEDURE — 80048 BASIC METABOLIC PNL TOTAL CA: CPT

## 2024-03-14 PROCEDURE — 36415 COLL VENOUS BLD VENIPUNCTURE: CPT

## 2024-03-14 PROCEDURE — 93005 ELECTROCARDIOGRAM TRACING: CPT

## 2024-03-15 ENCOUNTER — HOSPITAL ENCOUNTER (OUTPATIENT)
Facility: MEDICAL CENTER | Age: 29
End: 2024-03-15
Attending: OBSTETRICS & GYNECOLOGY | Admitting: OBSTETRICS & GYNECOLOGY
Payer: MEDICAID

## 2024-03-15 ENCOUNTER — ANESTHESIA (OUTPATIENT)
Dept: SURGERY | Facility: MEDICAL CENTER | Age: 29
End: 2024-03-15
Payer: MEDICAID

## 2024-03-15 VITALS
WEIGHT: 205.69 LBS | HEIGHT: 67 IN | OXYGEN SATURATION: 94 % | SYSTOLIC BLOOD PRESSURE: 106 MMHG | BODY MASS INDEX: 32.28 KG/M2 | TEMPERATURE: 97.2 F | DIASTOLIC BLOOD PRESSURE: 55 MMHG | RESPIRATION RATE: 17 BRPM | HEART RATE: 73 BPM

## 2024-03-15 LAB
HCG UR QL: NEGATIVE
PATHOLOGY CONSULT NOTE: NORMAL

## 2024-03-15 PROCEDURE — 700105 HCHG RX REV CODE 258: Mod: UD | Performed by: OBSTETRICS & GYNECOLOGY

## 2024-03-15 PROCEDURE — 160048 HCHG OR STATISTICAL LEVEL 1-5: Performed by: OBSTETRICS & GYNECOLOGY

## 2024-03-15 PROCEDURE — 160002 HCHG RECOVERY MINUTES (STAT): Performed by: OBSTETRICS & GYNECOLOGY

## 2024-03-15 PROCEDURE — 88305 TISSUE EXAM BY PATHOLOGIST: CPT

## 2024-03-15 PROCEDURE — 700111 HCHG RX REV CODE 636 W/ 250 OVERRIDE (IP): Mod: JZ,UD | Performed by: ANESTHESIOLOGY

## 2024-03-15 PROCEDURE — 88307 TISSUE EXAM BY PATHOLOGIST: CPT

## 2024-03-15 PROCEDURE — 160025 RECOVERY II MINUTES (STATS): Performed by: OBSTETRICS & GYNECOLOGY

## 2024-03-15 PROCEDURE — 160009 HCHG ANES TIME/MIN: Performed by: OBSTETRICS & GYNECOLOGY

## 2024-03-15 PROCEDURE — 502240 HCHG MISC OR SUPPLY RC 0272: Performed by: OBSTETRICS & GYNECOLOGY

## 2024-03-15 PROCEDURE — 160036 HCHG PACU - EA ADDL 30 MINS PHASE I: Performed by: OBSTETRICS & GYNECOLOGY

## 2024-03-15 PROCEDURE — 160046 HCHG PACU - 1ST 60 MINS PHASE II: Performed by: OBSTETRICS & GYNECOLOGY

## 2024-03-15 PROCEDURE — 160029 HCHG SURGERY MINUTES - 1ST 30 MINS LEVEL 4: Performed by: OBSTETRICS & GYNECOLOGY

## 2024-03-15 PROCEDURE — 160047 HCHG PACU  - EA ADDL 30 MINS PHASE II: Performed by: OBSTETRICS & GYNECOLOGY

## 2024-03-15 PROCEDURE — 700101 HCHG RX REV CODE 250: Mod: UD | Performed by: ANESTHESIOLOGY

## 2024-03-15 PROCEDURE — 160041 HCHG SURGERY MINUTES - EA ADDL 1 MIN LEVEL 4: Performed by: OBSTETRICS & GYNECOLOGY

## 2024-03-15 PROCEDURE — 81025 URINE PREGNANCY TEST: CPT

## 2024-03-15 PROCEDURE — 160035 HCHG PACU - 1ST 60 MINS PHASE I: Performed by: OBSTETRICS & GYNECOLOGY

## 2024-03-15 PROCEDURE — 88304 TISSUE EXAM BY PATHOLOGIST: CPT

## 2024-03-15 PROCEDURE — 700101 HCHG RX REV CODE 250: Mod: UD | Performed by: OBSTETRICS & GYNECOLOGY

## 2024-03-15 PROCEDURE — 700102 HCHG RX REV CODE 250 W/ 637 OVERRIDE(OP): Mod: UD | Performed by: ANESTHESIOLOGY

## 2024-03-15 PROCEDURE — A9270 NON-COVERED ITEM OR SERVICE: HCPCS | Mod: UD | Performed by: ANESTHESIOLOGY

## 2024-03-15 RX ORDER — CELECOXIB 200 MG/1
200 CAPSULE ORAL ONCE
Status: COMPLETED | OUTPATIENT
Start: 2024-03-15 | End: 2024-03-15

## 2024-03-15 RX ORDER — DEXAMETHASONE SODIUM PHOSPHATE 4 MG/ML
INJECTION, SOLUTION INTRA-ARTICULAR; INTRALESIONAL; INTRAMUSCULAR; INTRAVENOUS; SOFT TISSUE PRN
Status: DISCONTINUED | OUTPATIENT
Start: 2024-03-15 | End: 2024-03-15 | Stop reason: SURG

## 2024-03-15 RX ORDER — HYDROMORPHONE HYDROCHLORIDE 1 MG/ML
0.4 INJECTION, SOLUTION INTRAMUSCULAR; INTRAVENOUS; SUBCUTANEOUS
Status: DISCONTINUED | OUTPATIENT
Start: 2024-03-15 | End: 2024-03-15 | Stop reason: HOSPADM

## 2024-03-15 RX ORDER — LIDOCAINE HYDROCHLORIDE 20 MG/ML
INJECTION, SOLUTION EPIDURAL; INFILTRATION; INTRACAUDAL; PERINEURAL PRN
Status: DISCONTINUED | OUTPATIENT
Start: 2024-03-15 | End: 2024-03-15 | Stop reason: SURG

## 2024-03-15 RX ORDER — MEPERIDINE HYDROCHLORIDE 25 MG/ML
12.5 INJECTION INTRAMUSCULAR; INTRAVENOUS; SUBCUTANEOUS
Status: DISCONTINUED | OUTPATIENT
Start: 2024-03-15 | End: 2024-03-15 | Stop reason: HOSPADM

## 2024-03-15 RX ORDER — MIDAZOLAM HYDROCHLORIDE 1 MG/ML
1 INJECTION INTRAMUSCULAR; INTRAVENOUS
Status: DISCONTINUED | OUTPATIENT
Start: 2024-03-15 | End: 2024-03-15 | Stop reason: HOSPADM

## 2024-03-15 RX ORDER — BUPIVACAINE HYDROCHLORIDE 2.5 MG/ML
INJECTION, SOLUTION EPIDURAL; INFILTRATION; INTRACAUDAL
Status: DISCONTINUED
Start: 2024-03-15 | End: 2024-03-15 | Stop reason: HOSPADM

## 2024-03-15 RX ORDER — KETOROLAC TROMETHAMINE 15 MG/ML
INJECTION, SOLUTION INTRAMUSCULAR; INTRAVENOUS PRN
Status: DISCONTINUED | OUTPATIENT
Start: 2024-03-15 | End: 2024-03-15 | Stop reason: SURG

## 2024-03-15 RX ORDER — BUPIVACAINE HYDROCHLORIDE AND EPINEPHRINE 2.5; 5 MG/ML; UG/ML
INJECTION, SOLUTION EPIDURAL; INFILTRATION; INTRACAUDAL; PERINEURAL
Status: DISCONTINUED | OUTPATIENT
Start: 2024-03-15 | End: 2024-03-15 | Stop reason: HOSPADM

## 2024-03-15 RX ORDER — SODIUM CHLORIDE, SODIUM LACTATE, POTASSIUM CHLORIDE, CALCIUM CHLORIDE 600; 310; 30; 20 MG/100ML; MG/100ML; MG/100ML; MG/100ML
INJECTION, SOLUTION INTRAVENOUS CONTINUOUS
Status: DISCONTINUED | OUTPATIENT
Start: 2024-03-15 | End: 2024-03-15 | Stop reason: HOSPADM

## 2024-03-15 RX ORDER — HYDRALAZINE HYDROCHLORIDE 20 MG/ML
5 INJECTION INTRAMUSCULAR; INTRAVENOUS
Status: DISCONTINUED | OUTPATIENT
Start: 2024-03-15 | End: 2024-03-15 | Stop reason: HOSPADM

## 2024-03-15 RX ORDER — EPINEPHRINE 1 MG/ML(1)
AMPUL (ML) INJECTION
Status: DISCONTINUED
Start: 2024-03-15 | End: 2024-03-15 | Stop reason: HOSPADM

## 2024-03-15 RX ORDER — EPHEDRINE SULFATE 50 MG/ML
5 INJECTION, SOLUTION INTRAVENOUS
Status: DISCONTINUED | OUTPATIENT
Start: 2024-03-15 | End: 2024-03-15 | Stop reason: HOSPADM

## 2024-03-15 RX ORDER — DIPHENHYDRAMINE HYDROCHLORIDE 50 MG/ML
12.5 INJECTION INTRAMUSCULAR; INTRAVENOUS
Status: DISCONTINUED | OUTPATIENT
Start: 2024-03-15 | End: 2024-03-15 | Stop reason: HOSPADM

## 2024-03-15 RX ORDER — ACETAMINOPHEN 500 MG
1000 TABLET ORAL ONCE
Status: COMPLETED | OUTPATIENT
Start: 2024-03-15 | End: 2024-03-15

## 2024-03-15 RX ORDER — METOCLOPRAMIDE HYDROCHLORIDE 5 MG/ML
INJECTION INTRAMUSCULAR; INTRAVENOUS PRN
Status: DISCONTINUED | OUTPATIENT
Start: 2024-03-15 | End: 2024-03-15 | Stop reason: SURG

## 2024-03-15 RX ORDER — CEFAZOLIN SODIUM 1 G/3ML
INJECTION, POWDER, FOR SOLUTION INTRAMUSCULAR; INTRAVENOUS PRN
Status: DISCONTINUED | OUTPATIENT
Start: 2024-03-15 | End: 2024-03-15 | Stop reason: SURG

## 2024-03-15 RX ORDER — HYDROMORPHONE HYDROCHLORIDE 1 MG/ML
0.2 INJECTION, SOLUTION INTRAMUSCULAR; INTRAVENOUS; SUBCUTANEOUS
Status: DISCONTINUED | OUTPATIENT
Start: 2024-03-15 | End: 2024-03-15 | Stop reason: HOSPADM

## 2024-03-15 RX ORDER — HALOPERIDOL 5 MG/ML
1 INJECTION INTRAMUSCULAR
Status: DISCONTINUED | OUTPATIENT
Start: 2024-03-15 | End: 2024-03-15 | Stop reason: HOSPADM

## 2024-03-15 RX ORDER — HYDROMORPHONE HYDROCHLORIDE 2 MG/ML
INJECTION, SOLUTION INTRAMUSCULAR; INTRAVENOUS; SUBCUTANEOUS PRN
Status: DISCONTINUED | OUTPATIENT
Start: 2024-03-15 | End: 2024-03-15 | Stop reason: SURG

## 2024-03-15 RX ORDER — ONDANSETRON 2 MG/ML
4 INJECTION INTRAMUSCULAR; INTRAVENOUS
Status: DISCONTINUED | OUTPATIENT
Start: 2024-03-15 | End: 2024-03-15 | Stop reason: HOSPADM

## 2024-03-15 RX ORDER — HYDROMORPHONE HYDROCHLORIDE 1 MG/ML
0.1 INJECTION, SOLUTION INTRAMUSCULAR; INTRAVENOUS; SUBCUTANEOUS
Status: DISCONTINUED | OUTPATIENT
Start: 2024-03-15 | End: 2024-03-15 | Stop reason: HOSPADM

## 2024-03-15 RX ORDER — LABETALOL HYDROCHLORIDE 5 MG/ML
5 INJECTION, SOLUTION INTRAVENOUS
Status: DISCONTINUED | OUTPATIENT
Start: 2024-03-15 | End: 2024-03-15 | Stop reason: HOSPADM

## 2024-03-15 RX ORDER — ONDANSETRON 2 MG/ML
INJECTION INTRAMUSCULAR; INTRAVENOUS PRN
Status: DISCONTINUED | OUTPATIENT
Start: 2024-03-15 | End: 2024-03-15 | Stop reason: SURG

## 2024-03-15 RX ORDER — SCOLOPAMINE TRANSDERMAL SYSTEM 1 MG/1
1 PATCH, EXTENDED RELEASE TRANSDERMAL
Status: DISCONTINUED | OUTPATIENT
Start: 2024-03-15 | End: 2024-03-15 | Stop reason: HOSPADM

## 2024-03-15 RX ORDER — MIDAZOLAM HYDROCHLORIDE 1 MG/ML
INJECTION INTRAMUSCULAR; INTRAVENOUS PRN
Status: DISCONTINUED | OUTPATIENT
Start: 2024-03-15 | End: 2024-03-15 | Stop reason: SURG

## 2024-03-15 RX ORDER — SODIUM CHLORIDE, SODIUM LACTATE, POTASSIUM CHLORIDE, CALCIUM CHLORIDE 600; 310; 30; 20 MG/100ML; MG/100ML; MG/100ML; MG/100ML
INJECTION, SOLUTION INTRAVENOUS CONTINUOUS
Status: ACTIVE | OUTPATIENT
Start: 2024-03-15 | End: 2024-03-15

## 2024-03-15 RX ORDER — GABAPENTIN 300 MG/1
300 CAPSULE ORAL ONCE
Status: COMPLETED | OUTPATIENT
Start: 2024-03-15 | End: 2024-03-15

## 2024-03-15 RX ADMIN — ONDANSETRON 4 MG: 2 INJECTION INTRAMUSCULAR; INTRAVENOUS at 08:30

## 2024-03-15 RX ADMIN — GABAPENTIN 300 MG: 300 CAPSULE ORAL at 06:49

## 2024-03-15 RX ADMIN — SODIUM CHLORIDE, POTASSIUM CHLORIDE, SODIUM LACTATE AND CALCIUM CHLORIDE: 600; 310; 30; 20 INJECTION, SOLUTION INTRAVENOUS at 06:58

## 2024-03-15 RX ADMIN — PROPOFOL 200 MG: 10 INJECTION, EMULSION INTRAVENOUS at 07:35

## 2024-03-15 RX ADMIN — CEFAZOLIN 2 G: 1 INJECTION, POWDER, FOR SOLUTION INTRAMUSCULAR; INTRAVENOUS at 07:29

## 2024-03-15 RX ADMIN — KETOROLAC TROMETHAMINE 15 MG: 15 INJECTION, SOLUTION INTRAMUSCULAR; INTRAVENOUS at 08:30

## 2024-03-15 RX ADMIN — HYDROCODONE BITARTRATE AND ACETAMINOPHEN 15 MG: 7.5; 325 SOLUTION ORAL at 10:32

## 2024-03-15 RX ADMIN — LIDOCAINE HYDROCHLORIDE 60 MG: 20 INJECTION, SOLUTION EPIDURAL; INFILTRATION; INTRACAUDAL at 07:31

## 2024-03-15 RX ADMIN — FENTANYL CITRATE 25 MCG: 50 INJECTION, SOLUTION INTRAMUSCULAR; INTRAVENOUS at 10:32

## 2024-03-15 RX ADMIN — SUGAMMADEX 200 MG: 100 INJECTION, SOLUTION INTRAVENOUS at 08:47

## 2024-03-15 RX ADMIN — DEXAMETHASONE SODIUM PHOSPHATE 8 MG: 4 INJECTION INTRA-ARTICULAR; INTRALESIONAL; INTRAMUSCULAR; INTRAVENOUS; SOFT TISSUE at 07:35

## 2024-03-15 RX ADMIN — HYDROMORPHONE HYDROCHLORIDE 1 MG: 2 INJECTION INTRAMUSCULAR; INTRAVENOUS; SUBCUTANEOUS at 07:53

## 2024-03-15 RX ADMIN — METOCLOPRAMIDE 10 MG: 5 INJECTION, SOLUTION INTRAMUSCULAR; INTRAVENOUS at 07:29

## 2024-03-15 RX ADMIN — HYDROMORPHONE HYDROCHLORIDE 0.5 MG: 2 INJECTION INTRAMUSCULAR; INTRAVENOUS; SUBCUTANEOUS at 08:48

## 2024-03-15 RX ADMIN — ROCURONIUM BROMIDE 20 MG: 10 INJECTION, SOLUTION INTRAVENOUS at 08:20

## 2024-03-15 RX ADMIN — ROCURONIUM BROMIDE 50 MG: 10 INJECTION, SOLUTION INTRAVENOUS at 07:35

## 2024-03-15 RX ADMIN — HYDROMORPHONE HYDROCHLORIDE 0.5 MG: 2 INJECTION INTRAMUSCULAR; INTRAVENOUS; SUBCUTANEOUS at 08:51

## 2024-03-15 RX ADMIN — SCOPOLAMINE 1 PATCH: 1.5 PATCH, EXTENDED RELEASE TRANSDERMAL at 06:49

## 2024-03-15 RX ADMIN — CELECOXIB 200 MG: 200 CAPSULE ORAL at 06:49

## 2024-03-15 RX ADMIN — ACETAMINOPHEN 1000 MG: 500 TABLET, FILM COATED ORAL at 06:49

## 2024-03-15 RX ADMIN — MIDAZOLAM HYDROCHLORIDE 2 MG: 1 INJECTION, SOLUTION INTRAMUSCULAR; INTRAVENOUS at 07:29

## 2024-03-15 RX ADMIN — FENTANYL CITRATE 100 MCG: 50 INJECTION, SOLUTION INTRAMUSCULAR; INTRAVENOUS at 07:31

## 2024-03-15 RX ADMIN — SODIUM CHLORIDE, POTASSIUM CHLORIDE, SODIUM LACTATE AND CALCIUM CHLORIDE: 600; 310; 30; 20 INJECTION, SOLUTION INTRAVENOUS at 07:29

## 2024-03-15 ASSESSMENT — PAIN DESCRIPTION - PAIN TYPE
TYPE: SURGICAL PAIN
TYPE: ACUTE PAIN;SURGICAL PAIN

## 2024-03-15 ASSESSMENT — FIBROSIS 4 INDEX: FIB4 SCORE: 0.74

## 2024-03-15 ASSESSMENT — PAIN SCALES - GENERAL: PAIN_LEVEL: 0

## 2024-03-15 NOTE — ANESTHESIA TIME REPORT
Anesthesia Start and Stop Event Times       Date Time Event    3/15/2024 0722 Ready for Procedure     0729 Anesthesia Start     0912 Anesthesia Stop          Responsible Staff  03/15/24      Name Role Begin End    Reilly Nelson M.D. Anesth 0729 0912          Overtime Reason:  no overtime (within assigned shift)    Comments:

## 2024-03-15 NOTE — DISCHARGE INSTRUCTIONS
If any questions arise, call your provider.  If your provider is not available, please feel free to call the Surgical Center at [phone].    MEDICATIONS: Resume taking daily medication.  Take prescribed pain medication with food.  If no medication is prescribed, you may take non-aspirin pain medication if needed.  PAIN MEDICATION CAN BE VERY CONSTIPATING.  Take a stool softener or laxative such as senokot, pericolace, or milk of magnesia if needed.    Last pain medication given at     What to Expect Post Anesthesia    Rest and take it easy for the first 24 hours.  A responsible adult is recommended to remain with you during that time.  It is normal to feel sleepy.  We encourage you to not do anything that requires balance, judgment or coordination.    FOR 24 HOURS DO NOT:  Drive, operate machinery or run household appliances.  Drink beer or alcoholic beverages.  Make important decisions or sign legal documents.    To avoid nausea, slowly advance diet as tolerated, avoiding spicy or greasy foods for the first day.  Add more substantial food to your diet according to your provider's instructions.  Babies can be fed formula or breast milk as soon as they are hungry.  INCREASE FLUIDS AND FIBER TO AVOID CONSTIPATION.    MILD FLU-LIKE SYMPTOMS ARE NORMAL.  YOU MAY EXPERIENCE GENERALIZED MUSCLE ACHES, THROAT IRRITATION, HEADACHE AND/OR SOME NAUSEA.    Diet    Resume your normal diet as tolerated.  A diet low in cholesterol, fat, and sodium is recommended for good health.     Hysteroscopy Discharge instructions    ACTIVITIES:  DO NOT USE tampons, douche, or have sexual intercourse for 2 weeks post surgery.    After discharge from the hospital, rest today, then you may resume full routine activities when you feel ready.     DRIVING:   You may drive whenever you are off pain medications and are able to perform the activities needed to drive, i.e. turning, bending, twisting, etc.    BATHING:   You may shower starting  tomorrow. No tub baths, hot tubs, or swimming until your follow up appointment.     WOUND CARE:  You may experience some cramping discomfort for several days. It is normal to have a brownish to slightly bloody discharge after surgery.    Expect some vaginal bleeding, however if you pass clots or saturate a keri pad more often than once an hour or are not comfortable with the amount of blood you notice please notify your provider.      BOWEL FUNCTION:  Constipation is common after surgery. The combination of pain medication and decreased activity level can cause constipation in otherwise normal patients. If you feel this is occurring, take a laxative (Milk of Magnesia, Ex-Lax, Senokot, etc.) until the problem has resolved. It also helps to stay regular by including fiber in your diet (for example: bran or fruits and vegetables) and drink plenty of liquids (water, juice, etc.).

## 2024-03-15 NOTE — H&P
DATE OF ADMISSION:  03/15/2024     PREOPERATIVE DIAGNOSES:  1.  Dysmenorrhea.  2.  Abnormal uterine bleeding.  3.  Desires conservative surgical management.     PLANNED PROCEDURE:  Pelvic examination under anesthesia, pelviscopy, lysis of   adhesions, excision/fulguration of endometriosis implants, cystoscopy,   diagnostic and operative hysteroscopy, excision of endometrial   polyp/submucosal fibroid/endometrial curettage with the MyoSure/oscillating   cutting blade, and other medically necessary procedures.     HISTORY OF PRESENT ILLNESS:  The patient is a 28-year-old para 1-0-0-1,   sexually active woman with an unsure and irregular last menstrual period who   has a long history of dysmenorrhea and abnormal bleeding and who desires   conservative surgical management.  The patient underwent a pelvic ultrasound   on 2024 which demonstrated that her uterus measured 7.9x7.4x3.5 cm.  The   endometrial stripe measured 0.8 cm.  The uterus appeared heterogeneous   possibly consistent with adenomyosis.  The right ovary measured 3.9x2.1x2.1   cm.  The left ovary measured 4.1x2x1.3 cm.  There were no obvious concerning   adnexal masses or free pelvic fluid.  The patient was consented for the above   procedure and she agrees to proceed.     PAST SURGICAL HISTORY:  None.     PAST MEDICAL HISTORY:  LAURO 1, status post colposcopy with normal biopsies and   ECC.     PAST OBSTETRIC HISTORY:   x1 in 2014.     GYNECOLOGIC HISTORY:  She denies STDs, PID or HSV.  She is not currently using   anything for birth control.     FAMILY HISTORY:  Noncontributory.     REVIEW OF SYSTEMS:  Negative.     MEDICATIONS:  No current medications.     ALLERGIES:  PENICILLIN LEADING TO RASH.     SOCIAL HISTORY:  She denies alcohol, tobacco or drugs of abuse.     PHYSICAL EXAMINATION:  VITAL SIGNS:  Blood pressure 133/77, pulse 82, temp 98.2.  GENERAL:  Alert, awake and oriented x3, in no acute distress.  LUNGS:  Clear to auscultation  bilaterally.  HEART:  Regular rate and rhythm.  No murmurs, rubs or gallops.  S1, S2 intact.  ABDOMEN:  Soft and nontender.  EXTREMITIES:  No clubbing, cyanosis or edema.     LABORATORY DATA:  Preoperative laboratory studies are pending.     ASSESSMENT AND PLAN:  A 28-year-old para 1-0-0-1, sexually active woman with   an unsure and irregular last menstrual period, who has abnormal uterine   bleeding and dysmenorrhea who desires conservative surgical management and   evaluation for possible endometriosis.  The risks, benefits and alternatives   of a pelvic examination under anesthesia, pelviscopy, lysis of adhesions,   excision/fulguration of endometriosis implants, cystoscopy, diagnostic and   operative hysteroscopy, excision of endometrial polyp/submucosal   fibroid/endometrial curettage with the MyoSure/oscillating cutting blade, and   other medically necessary procedures were discussed with the patient and she   agrees to proceed.        ______________________________  Liliam Conte MD    [facility]    DD:  03/14/2024 16:24  DT:  03/14/2024 17:28    Job#:  [id number]

## 2024-03-15 NOTE — OR NURSING
Report received from Matilda LAWLER.     1015: Pt arrived to [facility], pain 8/10    1026 placed on 1L NC sats down to 80% on room air.     1032 prn pain medication provided     1038 family brought to bedside     1043 patient feeling nauseas, prn zofran provided     1049 Discharge instructions reviewed with family at bedside, verbalize understanding and all questions answered.     1102 pt up to bathroom Pt then changed into clothing with assistance.     1116 patient not able to void, hooked patient to IV fluids.     1227 patient up to bathroom attempt to void again     1229 patient able to void    1231: IV and ID bands removed.    1234 Pt escorted to lobby via wheelchair, accompanied by RN. All personal belongings and discharge instructions with patient.

## 2024-03-15 NOTE — ANESTHESIA PROCEDURE NOTES
Airway    Date/Time: 3/15/2024 7:38 AM    Performed by: Reilly Nelson M.D.  Authorized by: Reilly Nelson M.D.    Location:  OR  Urgency:  Elective  Indications for Airway Management:  Anesthesia      Spontaneous Ventilation: absent    Sedation Level:  Deep  Preoxygenated: Yes    Patient Position:  Sniffing  Final Airway Type:  Endotracheal airway  Final Endotracheal Airway:  ETT  Cuffed: Yes    Technique Used for Successful ETT Placement:  Direct laryngoscopy    Insertion Site:  Oral  Blade Type:  Chintan  Laryngoscope Blade/Videolaryngoscope Blade Size:  3  ETT Size (mm):  6.5  Measured from:  Lips  ETT to Lips (cm):  21  Placement Verified by: auscultation and capnometry    Cormack-Lehane Classification:  Grade IIa - partial view of glottis  Number of Attempts at Approach:  1   Airway placement was atraumatic x1 attempt

## 2024-03-15 NOTE — ANESTHESIA POSTPROCEDURE EVALUATION
Patient: Tanisha Cole    Procedure Summary       Date: 03/15/24 Room / Location: [facility] ROOM 25 / SURGERY SAME DAY [facility]    Anesthesia Start: 0729 Anesthesia Stop: 0912    Procedures:       PELVIC EXAM UNDER ANESTHESIA, PELVISCOPY, LYSIS OF ADHESIONS,, DIAGNOSTIC AND OPERATIVE HYSTEROSCOPY, RIGHT PARATUBAL CYST EXCISION, EXCISION OF FIBROID, ENDOMETRIAL CURETTAGE WITH OSCILLATING CUTTING BLADES, CYSTOSCOPY, PLACEMENT OF SURGICEL (Abdomen)      HYSTEROSCOPY, WITH TISSUE REMOVAL, USING HYSTEROSCOPIC ROTATING CUTTER BLADE (Uterus) Diagnosis: (ABNORMAL UTERINE BLEEDING, DYSMENORRHEA, PELVIC PAIN)    Surgeons: Liilam Conte M.D. Responsible Provider: Reilly Nelson M.D.    Anesthesia Type: general ASA Status: 2            Final Anesthesia Type: general  Last vitals  BP   Blood Pressure: 122/65    Temp   36.9 °C (98.4 °F)    Pulse   77   Resp   14    SpO2   99 %      Anesthesia Post Evaluation    Patient location during evaluation: PACU  Patient participation: complete - patient participated  Level of consciousness: awake and alert  Pain score: 0    Airway patency: patent  Anesthetic complications: no  Cardiovascular status: hemodynamically stable  Respiratory status: acceptable  Hydration status: euvolemic    PONV: none          There were no known notable events for this encounter.     Nurse Pain Score: 0 (NPRS)

## 2024-03-15 NOTE — ANESTHESIA PREPROCEDURE EVALUATION
Case: [id number] Date/Time: 03/15/24 0715    Procedures:       PELVIC EXAM UNDER ANESTHESIA, PELVISCOPY, LYSIS OF ADHESIONS, EXCISION /FULGURATION OF ENDOMETRIOSIS, DIAGNOSTIC AND OPERATIVE HYSTEROSCOPY, EXCISION OF POLYP/FIBROID, ENDOMETRIAL  CURETTAGE WITH OSCILLATING CUTTING BLADES, CYSTOSCOPY, AND ANY OTHER MEDICALLY NECESSARY PROCEDURES      HYSTEROSCOPY, WITH TISSUE REMOVAL, USING HYSTEROSCOPIC ROTATING CUTTER BLADE    Pre-op diagnosis: ABNORMAL UTERINE BLEEDING, DYSMENORRHEA, PELVIC PAIN    Location: [facility] ROOM 25 / SURGERY SAME DAY [facility]    Surgeons: PRO Michelle H&P:  PAST MEDICAL HISTORY:   28 y.o. female who presents for Procedure(s):  PELVIC EXAM UNDER ANESTHESIA, PELVISCOPY, LYSIS OF ADHESIONS, EXCISION /FULGURATION OF ENDOMETRIOSIS, DIAGNOSTIC AND OPERATIVE HYSTEROSCOPY, EXCISION OF POLYP/FIBROID, ENDOMETRIAL  CURETTAGE WITH OSCILLATING CUTTING BLADES, CYSTOSCOPY, AND ANY OTHER MEDICALLY NECESSARY PROCEDURES  HYSTEROSCOPY, WITH TISSUE REMOVAL, USING HYSTEROSCOPIC ROTATING CUTTER BLADE.  She has current and past medical problems significant for:    Patient denies history of seizures or strokes  Patient denies history of diabetes or thyroid disease  Patient denies history of Asthma or COPD  Patient denies history of GERD or esophageal disease  Patient denies history of SARAHI  Patient denies history of renal failure  Patient denies history of bleeding disorders  Patient denies history of upper or lower extremity motor/sensory deficits     Patient denies history of previous MI, chest pain or shortness of breath  Able to climb 2 flights of stair without dyspnea or angina, > 4 METs     Patient denies history of complications with anesthesia    Anesthetic procedure and risks discussed with patient in detail.  Risks include but are not limited to PONV, pain, sore throat, damage to teeth/lips/gums, aspiration, positioning injury, allergic reaction, vocal cord injury, prolonged  intubation, stroke, and/or cardiopulmonary problems up to and including death.  Patient indicates complete understanding. All patient/family questions were answered to full satisfaction and they agree to proceed as above planned.    Past Medical History:   Diagnosis Date    Heart palpitations 03/06/2024    history of    High cholesterol 2023    just watching diet; told by doctor they would recheck her in a year - no need for medication    Patient denies medical problems        SMOKING/ALCOHOL/RECREATIONAL DRUG USE:  Social History     Tobacco Use    Smoking status: Never     Passive exposure: Current (works in a [employer])    Smokeless tobacco: Never   Vaping Use    Vaping Use: Never used   Substance Use Topics    Alcohol use: Not Currently     Comment: socially    Drug use: No     Social History     Substance and Sexual Activity   Drug Use No       PAST SURGICAL HISTORY:  History reviewed. No pertinent surgical history.    ALLERGIES:   Allergies   Allergen Reactions    Penicillins Rash     rash       MEDICATIONS:  No current facility-administered medications on file prior to encounter.     No current outpatient medications on file prior to encounter.       LABS:  Recent Labs     03/14/24  1556   WBC 6.4   RBC 4.62   HEMOGLOBIN 12.4   HEMATOCRIT 37.2   MCV 80.5*   MCH 26.8*   RDW 38.4   PLATELETCT 234   MPV 11.8   NEUTSPOLYS 52.60   LYMPHOCYTES 39.40   MONOCYTES 6.10   EOSINOPHILS 1.40   BASOPHILS 0.30      Lab Results   Component Value Date/Time    SODIUM 137 03/14/2024 1556    POTASSIUM 3.7 03/14/2024 1556    CHLORIDE 102 03/14/2024 1556    CO2 24 03/14/2024 1556    GLUCOSE 91 03/14/2024 1556    BUN 10 03/14/2024 1556    CALCIUM 9.2 03/14/2024 1556         PREVIOUS ANESTHETICS: See EMR  __________________________________________    Relevant Problems   No relevant active problems       Physical Exam    Airway   Mallampati: II  TM distance: >3 FB  Neck ROM: full       Cardiovascular - normal exam  Rhythm:  regular  Rate: normal  (-) murmur     Dental - normal exam           Pulmonary - normal exam  Breath sounds clear to auscultation     Abdominal   (+) obese     Neurological - normal exam                   Anesthesia Plan    ASA 2       Plan - general       Airway plan will be ETT          Induction: intravenous    Postoperative Plan: Postoperative administration of opioids is intended.    Pertinent diagnostic labs and testing reviewed    Informed Consent:    Anesthetic plan and risks discussed with patient.    Use of blood products discussed with: patient whom consented to blood products.

## 2024-03-15 NOTE — OP REPORT
DATE OF SERVICE:  03/15/2024     PREOPERATIVE DIAGNOSES:  1.  Dysmenorrhea.  2.  Abnormal uterine bleeding.  3.  Desires conservative surgical management.     POSTOPERATIVE DIAGNOSES:  1.  Dysmenorrhea.  2.  Abnormal uterine bleeding.  3.  Desires conservative surgical management.  4.  Pelvic adhesive disease.  5.  Left ovarian nodule.  6.  Right paratubal cyst.  7.  Endometriosis.  8.  Interstitial cystitis.  9.  Possible endometrial polyps.  Pathology is pending.     PROCEDURES:  Pelvic examination under anesthesia, pelviscopy, lysis of   adhesions, excision of left ovarian nodule and right paratubal cyst, placement   of Surgicel, cystoscopy with bladder distention, diagnostic and operative   hysteroscopy, endometrial polypectomy and endometrial curettage with the   MyoSure/oscillating cutting blade.     SURGEON:  Liliam Conte MD     ANESTHESIOLOGIST:  Reilly Nelson MD     ANESTHESIA:  General endotracheal tube anesthesia and local anesthetic.     SPECIMENS:  1.  Left ovarian nodule.  2.  Right paratubal cyst.  3.  Endometrial curettings and polyps.     ESTIMATED BLOOD LOSS:  10 mL.     FINDINGS:  On examination under anesthesia, her uterus is anteverted, mobile   and not enlarged.  No obvious adnexal masses.  A 6 PAULINE manipulator was used   during the procedure.     LAPAROSCOPIC FINDINGS:  Normal uterus.  Normal bilateral tubes and ovaries.    Right tube with a pedunculated paratubal cyst and left ovary with a 0.5 mm   ovarian nodule/fibroid.  Both were excised.  Endometriosis implants noted in   the midline of the pelvis on the pelvic peritoneum anterior to the rectum.    Pelvic adhesions from the left pelvic sidewall to the left bowel epiploica and   left adnexa.  The anterior cul-de-sac peritoneum with erythema consistent   with inflammation, but no obvious endometriosis implants noted.     CYSTOSCOPIC FINDINGS:  After instillation of 500 mL of saline and bladder   distention, the bladder  mucosa was injected and erythematous with dilated   vasculature likely consistent with interstitial cystitis.  No concerning   bladder masses were noted.     HYSTEROSCOPIC FINDINGS:  Bilateral tubal ostia visualized.  Plush endometrium   was noted, likely consistent with endometrial polyps.  Pathology is pending.     COMPLICATIONS:  None apparent.     INDICATIONS FOR THE PROCEDURE:  The patient is a 28-year-old para 1-0-0-1,   sexually active woman with an unsure and irregular last menstrual period, who   has a long history of dysmenorrhea and abnormal uterine bleeding and desires   conservative surgical management.     DETAILS OF THE PROCEDURE:  The patient was taken to the operating room where   she underwent general endotracheal tube anesthesia.  She was then placed in   the dorsal lithotomy position in the [facility].  A pelvic   examination under anesthesia was performed and the findings are as documented   above.  The patient was then prepped and draped in the dorsal lithotomy   position.  The Babcock catheter was placed in her urinary bladder.  Medium   Graves speculum was inserted into the vagina.  The cervix was visualized and   the anterior lip of the cervix was grasped with an Allis clamp.  The   endocervix Was dilated to allow the 6 PAULNIE manipulator to be placed.  The PAULINE   manipulator was placed without difficulty.     Attention turned to the laparoscopic portion of the procedure:  A 10 mm   infraumbilical skin incision was made with a scalpel after the instillation of   0.25% Marcaine with epinephrine.  The incision was carried down to the level   of the fascia.  The fascia was grasped with Kocher clamps, elevated and   incised with Champion scissors.  The incision was extended laterally with Champion   scissors.  Either side of the fascial incision was sutured with 0 Vicryl as   stay sutures.  The S retractors were placed within all of these incisions and   the peritoneum was identified, grasped  with the Pean clamp, elevated and   incised with Metzenbaum scissors.  The incision was extended laterally bluntly   with Metzenbaum scissors.  Intraabdominal entry was confirmed as bowel and   omentum were visualized.  The S retractors were placed within all of these   incisions and then the Ramy trocar was inserted under direct visualization.    The CO2 gas was connected and the opening pressure was 8 mmHg.  The patient   was placed in Trendelenburg.  The patient's pelvis and upper abdomen were   explored and findings are as documented above.  A second trocar site was   identified 3 fingerbreadths above the pubic symphysis.  A 5 mm skin incision   was made with a scalpel after the instillation of 0.25% Marcaine with   epinephrine.  The 5 mm trocar was inserted under direct visualization.  The   uterus was elevated and deviated bilaterally.  The bilateral ureters were   identified coursing through the medial leaves of the broad ligament.  A   paratubal cyst was visualized on the right fallopian tube and an ovarian   nodule was noted on the left ovary.  The findings are as documented above.    Using the Prestige clamp, the right paratubal cyst was grasped gently and   using the LigaSure was cauterized and then incised and sent to pathology.  The   ovarian nodule was grasped with the Prestige clamp and removed.  Areas of   bleeding cauterized with electrocautery.  There were adhesions noted on the   left pelvic sidewall to the left adnexa and bowel epiploica.  These were   cauterized and then incised with the LigaSure.  The pelvis was copiously   irrigated with saline.  There was no evidence of endometriosis implants   anteriorly and the anterior cul-de-sac.  Posteriorly in the midline lateral to   the uterosacral ligaments, there were whitish yellow plaque-like lesions   possibly consistent with endometriosis.  I was unable to remove these and thus   fulgurated them with electrocautery.  The pelvis was again  irrigated with   saline.  The areas of lysis of adhesions were visualized and were hemostatic.    Surgicel was placed along the areas of lysis of adhesions, endometriosis,   fulguration, and removal of the right paratubal cyst and left ovarian nodule.     The trocars were removed under direct visualization.  The CO2 gas was released   from the patient's abdomen.  The fascia was reapproximated using the 0 Vicryl   stay sutures and a single figure-of-X of 0 Vicryl suture was used to bring   the subcutaneous tissues into better proximity.  The skin was closed with 4-0   Monocryl in a subcuticular fashion.  The 5 mm trocar site skin was   reapproximated with 4-0 Monocryl in a subcuticular fashion.  The incisions   were dressed with benzoin, Steri-Strips, 2x2 and Tegaderm.     Attention turned to the cystoscopic portion of the procedure.  The Babcock   catheter was removed.  The 70-degree cystoscope was inserted and 500 mL of   saline were instilled.  The bladder was thoroughly examined after distention   and findings are as documented above.  The bladder was then drained of the   instilled fluid and the Babcock catheter was left out of the patient's bladder.    The uterine manipulator was removed from the patient's uterine cavity.  The   speculum was inserted.  The cervix was visualized and grasped with an Allis   clamp.  A paracervical block was performed using 10 mL of 0.25% Marcaine with   epinephrine.  The endocervix was dilated to allow the MyoSure Lite camera to   be inserted.  The MyoSure Lite camera was inserted without difficulty to the   level of the fundus and the bilateral tubal ostia were visualized.  Using the   MyoSure Lite oscillating cutting blade, an endometrial polypectomy and   endometrial curettage was performed.  Before and after pictures were taken.    The MyoSure oscillating cutting blade and camera were removed.  The cervix was   visualized.  The Allis clamp was removed and bleeding was made  hemostatic   with direct pressure and silver nitrate.  The speculum was removed.  The   patient was taken out of dorsal lithotomy position.  She was then extubated   and taken to the PACU in stable condition.  Sponge, lap, needle and instrument   counts were correct x2.        ______________________________  MD KENDELL Garica/BRAIN    DD:  03/15/2024 13:20  DT:  03/15/2024 15:06    Job#:  [id number]

## 2024-03-15 NOTE — OR SURGEON
Immediate Post OP Note    PreOp Diagnosis:     1.  Dysmenorrhea.  2.  Abnormal uterine bleeding.  3.  Desires conservative surgical management.      PostOp Diagnosis:      As above.  Pelvic adhesive disease.  Left ovarian nodule.  Right paratubal cyst.  Endometriosis.  IC.  Possible endometrial polyps - pathology pending.      Procedure(s):  PELVIC EXAM UNDER ANESTHESIA, PELVISCOPY, LYSIS OF ADHESIONS, EXCISION /FULGURATION OF ENDOMETRIOSIS, DIAGNOSTIC AND OPERATIVE HYSTEROSCOPY, EXCISION OF POLYP/FIBROID, ENDOMETRIAL  CURETTAGE WITH OSCILLATING CUTTING BLADES, CYSTOSCOPY, AND PLACEMENT OF SURGICEL, DIAGNOSTIC AND OPERATIVE HYSTEROSCOPY, WITH ENDOMETRIAL POLYPECTOMY AND ENDOMETRIAL CURETTAGE/TISSUE REMOVAL, USING HYSTEROSCOPIC ROTATING CUTTER BLADE    Surgeon(s):  Liliam Conte M.D.    Anesthesiologist/Type of Anesthesia:  Anesthesiologist: Reilly Nelson M.D./MARK and local  Surgical Staff:  Circulator: Anita A Reyes, R.N.; Shahana Moody R.N.  Relief Circulator: Paige Smith R.N.  Relief Scrub: Sung Baron  Scrub Person: Anu Dietz; Wade Lozano    Specimens removed if any:  ID Type Source Tests Collected by Time Destination   A : left ovarian nodule Other Other PATHOLOGY SPECIMEN Liliam Conte M.D. 3/15/2024  8:31 AM    B : right paratubal cyst Other Other PATHOLOGY SPECIMEN Liliam Conte M.D. 3/15/2024  8:32 AM    C : endometrial curetting Other Other PATHOLOGY SPECIMEN Liliam Conte M.D. 3/15/2024  8:42 AM        Estimated Blood Loss: 10 CC    Findings: eua - uterus is AV mobile and not enlarged.  No obvious adnexal masses.  6 PAULINE manipulator used.    Laparoscopic findings: normal uterus.  Normal bilateral tubes and ovaries.  Right tube with a pedunculated para tubal cyst and left ovary with a left ovarian nodule/fibroid.  Both removed.  Endometriosis implants in the midline of the pelvis on the pelvic peritoneum near the rectum.  Pelvic  adhesions from the left pelvic sidewall to the left bowel epiploica and left adnexa.  Anterior cul-de-sac peritoneum with erythema consistent with inflammation but no obvious endometriosis implants.    Cystoscopic findings: after instillation of 500 cc of saline and bladder distension, her bladder mucosa was injected and erythematous likely consistent with IC.  No concerning bladder masses noted.      Hysteroscopic findings: bilateral tubal ostia visualized.  Plush endometrium noted likely consistent with endometrial polyps.  Pathology pending.    Complications: none apparent.        3/15/2024 8:49 AM Liliam Conte M.D.

## 2024-03-15 NOTE — OR NURSING
0900: To PACU from OR via gurney, sleeping, respirations spontaneous and non-labored via OPA. X2 midline abdo surgical dressings c/d/I. No visible keri drainage.   0915: No change.  0926: Rouses spontaneously, denies pain/nausea, O2 d/johnathan  0930: No change    0940: SpO2 drops to 85% on RA, Pt stimulated, responsive but remains very drowsy, O2 resumed.  0945: Dozes when not actively stimulated.  1000: Roused, DB+C, denies discomfort. No change in surgical site assessment. Commenced po water.  1013: More awake, tolerates RA, bedside report to Barbi LAWLER who then transfers pt to Stage 2.

## 2024-03-15 NOTE — OR NURSING
*** patient arrival from OR; report received from MD and RN. Patient attached to monitor and VSS with patient on *L O2 via mask.

## 2024-11-19 ENCOUNTER — APPOINTMENT (OUTPATIENT)
Dept: OBGYN | Facility: CLINIC | Age: 29
End: 2024-11-19

## 2025-01-08 ENCOUNTER — APPOINTMENT (OUTPATIENT)
Dept: OBGYN | Facility: CLINIC | Age: 30
End: 2025-01-08

## 2025-02-03 ENCOUNTER — APPOINTMENT (OUTPATIENT)
Dept: OBGYN | Facility: CLINIC | Age: 30
End: 2025-02-03
Payer: COMMERCIAL

## 2025-02-04 ENCOUNTER — APPOINTMENT (OUTPATIENT)
Dept: OBGYN | Facility: CLINIC | Age: 30
End: 2025-02-04

## 2025-02-14 ENCOUNTER — GYNECOLOGY VISIT (OUTPATIENT)
Dept: OBGYN | Facility: CLINIC | Age: 30
End: 2025-02-14
Payer: COMMERCIAL

## 2025-02-14 VITALS
DIASTOLIC BLOOD PRESSURE: 88 MMHG | HEIGHT: 66 IN | WEIGHT: 207 LBS | SYSTOLIC BLOOD PRESSURE: 120 MMHG | BODY MASS INDEX: 33.27 KG/M2

## 2025-02-14 DIAGNOSIS — Z31.69 INFERTILITY COUNSELING: ICD-10-CM

## 2025-02-14 DIAGNOSIS — Z87.42 HISTORY OF ENDOMETRIOSIS: ICD-10-CM

## 2025-02-14 DIAGNOSIS — N73.6 PELVIC ADHESIVE DISEASE: ICD-10-CM

## 2025-02-14 DIAGNOSIS — N93.9 ABNORMAL UTERINE BLEEDING (AUB): ICD-10-CM

## 2025-02-14 DIAGNOSIS — N94.6 DYSMENORRHEA: ICD-10-CM

## 2025-02-14 PROBLEM — Z90.710 HISTORY OF HYSTERECTOMY: Status: ACTIVE | Noted: 2025-02-14

## 2025-02-14 PROCEDURE — G0101 CA SCREEN;PELVIC/BREAST EXAM: HCPCS

## 2025-02-14 PROCEDURE — 3079F DIAST BP 80-89 MM HG: CPT

## 2025-02-14 PROCEDURE — 3074F SYST BP LT 130 MM HG: CPT

## 2025-02-14 NOTE — PROGRESS NOTES
Patient here for GYN exam.  LMP= 1/19/25   BCM: none   Last pap: march 2024 [facility]   Last mammogram if applies: n/a   Phone number: [phone] (home)   Pharmacy verified  Pt would like to talk about family planning, pt diagnosed with endo

## 2025-02-14 NOTE — PROGRESS NOTES
Tanisha Cole is a 29 y.o. female who presents for her Gynecologic Exam        HPI Comments: Pt presents for well woman exam. She would like to discuss fertility and family planning today. . Patient's last menstrual period was 01/19/2025 (exact date).. She reports one vaginal birth in 2014. She reports needing fertility assistance to get pregnant but does not recall the name of the medication. She tried for 3 years with her last partner to conceive with no results and has been trying for 2 years with her current partner who has fathered other children to conceive without success.    In March of 2024, she had a pelviscopy, cystoscoscopy, lysis of adhesions, and hysteroscopy with Dr. Patel for endometriosis, dysmenorrhea, and abnormal uterine bleeding. A left ovarian nodule, right paratubal cyst and endometrial polyps were removed and sent to pathology. All were benign. She reports her menstrual bleeding is lighter since the procedure but she still reports pelvic pain during her menses.      She reports regular, painful menses that are 5-7 days in length. No clots or heavy bleeding. She is using nothing for birth control. Does desire conception in the next year.  Patient does not report leaking of urine or constipation.     PAP WNL 3/2024    Review of Systems   Pertinent positives documented in HPI and all other systems reviewed & are negative    All PMH, PSH, allergies, social history and FH reviewed and updated today:  Past Medical History:   Diagnosis Date    Heart palpitations 03/06/2024    history of    High cholesterol 2023    just watching diet; told by doctor they would recheck her in a year - no need for medication    Infertility counseling 2/14/2025    Patient denies medical problems      Past Surgical History:   Procedure Laterality Date    [place] LAP,DIAGNOSTIC ABDOMEN  3/15/2024    Procedure: PELVIC EXAM UNDER ANESTHESIA, PELVISCOPY, LYSIS OF ADHESIONS,, DIAGNOSTIC AND OPERATIVE HYSTEROSCOPY, RIGHT  "PARATUBAL CYST EXCISION, EXCISION OF FIBROID, ENDOMETRIAL CURETTAGE WITH OSCILLATING CUTTING BLADES, CYSTOSCOPY WITH BLADDER DILATION, PLACEMENT OF SURGICEL;  Surgeon: Liliam Conte M.D.;  Location: SURGERY SAME DAY [facility];  Service: Gynecology    HYSTEROSCOPY WITH MYOSURE  3/15/2024    Procedure: HYSTEROSCOPY, WITH TISSUE REMOVAL, USING HYSTEROSCOPIC ROTATING CUTTER BLADE;  Surgeon: Liliam Conte M.D.;  Location: SURGERY SAME DAY [facility];  Service: Gynecology     Penicillins  Social History     Socioeconomic History    Marital status: Single   Tobacco Use    Smoking status: Never     Passive exposure: Current (works in a [employer])    Smokeless tobacco: Never   Vaping Use    Vaping status: Never Used   Substance and Sexual Activity    Alcohol use: Not Currently     Comment: socially    Drug use: No    Sexual activity: Yes     Partners: Male     Birth control/protection: None   Social History Narrative    ** Merged History Encounter **         ** Merged History Encounter **          Family History   Problem Relation Age of Onset    Hypertension Father     No Known Problems Sister     No Known Problems Brother     Breast Cancer Paternal Grandmother     Breast Cancer Maternal Aunt 32    Hyperlipidemia Neg Hx     Heart Disease Neg Hx     Diabetes Neg Hx     Psychiatric Illness Neg Hx     Genetic Disorder Neg Hx      Medications:   No current [facility]-ordered outpatient medications on file.     No current [facility]-ordered facility-administered medications on file.          Objective:   Vital measurements:  /88   Ht 5' 6\"   Wt 207 lb   Body mass index is 33.41 kg/m². (Goal BM I>18 <25)    Physical Exam   Nursing note and vitals reviewed.  Constitutional: She is oriented to person, place, and time. She appears well-developed and well-nourished. No distress.     HEENT: Normocephalic and atraumatic. External ears normal. Nose normal. Conjunctivae and EOM are normal. Pupils are equal, round, and reactive " to light. No scleral icterus.     Neck: Normal range of motion. Neck supple. No thyromegaly present.     Pulmonary/Chest: Effort normal and breath sounds clear in all quadrants. No respiratory distress.     Cardiovascular: Regular, rate and rhythm. No JVD.    Abdominal: Soft. Bowel sounds are normal. She exhibits no distension and no mass. No tenderness. She has no rebound and no guarding.     Breast: Declines breast exam today    Genitourinary:  Declines pelvic exam today    Rectal exam deferred    Musculoskeletal: Normal range of motion. She exhibits no edema and no tenderness.     Lymphadenopathy: She has no cervical adenopathy.     Neurological: She is alert and oriented to person, place, and time. She exhibits normal muscle tone.     Skin: Skin is warm and dry. No rash noted. She is not diaphoretic. No erythema. No pallor.     Psychiatric: She has a normal mood and affect. Her behavior is normal. Judgment and thought content normal.               Assessment:     1. History of endometriosis        2. Dysmenorrhea        3. Abnormal uterine bleeding (AUB)        4. Pelvic adhesive disease        5. Infertility counseling  ESTRADIOL    ANTI-MULLERIAN HORMONE([facility])    FSH/LH    PROGESTERONE, SERUM            Plan:   1. Physical exam performed. Discussed intervals of paps at current age per guidelines. PAP due 2027  2. Encouraged general breast awareness and self breast exam if appropriate. We discussed recommendation of yearly mammogram at age 39.   3. Counseling: breast self exam and family planning choices  4. Fertility labs ordered for day 3 and day 21 of cycle, slips given  5. Start tracking ovulation using ovulation predictor kit and start tracking cycles  6. Referral to Dr. Babin to discuss fertility medication options

## 2025-02-28 ENCOUNTER — APPOINTMENT (OUTPATIENT)
Dept: OBGYN | Facility: CLINIC | Age: 30
End: 2025-02-28
Payer: COMMERCIAL

## 2025-03-09 ENCOUNTER — OFFICE VISIT (OUTPATIENT)
Dept: URGENT CARE | Facility: CLINIC | Age: 30
End: 2025-03-09
Payer: COMMERCIAL

## 2025-03-09 VITALS
WEIGHT: 205.5 LBS | SYSTOLIC BLOOD PRESSURE: 126 MMHG | HEART RATE: 86 BPM | RESPIRATION RATE: 12 BRPM | HEIGHT: 67 IN | OXYGEN SATURATION: 98 % | BODY MASS INDEX: 32.25 KG/M2 | DIASTOLIC BLOOD PRESSURE: 72 MMHG | TEMPERATURE: 98.4 F

## 2025-03-09 DIAGNOSIS — J10.1 INFLUENZA A: ICD-10-CM

## 2025-03-09 DIAGNOSIS — J02.9 PHARYNGITIS, UNSPECIFIED ETIOLOGY: ICD-10-CM

## 2025-03-09 LAB
FLUAV RNA SPEC QL NAA+PROBE: POSITIVE
FLUBV RNA SPEC QL NAA+PROBE: NEGATIVE
RSV RNA SPEC QL NAA+PROBE: NEGATIVE
S PYO DNA SPEC NAA+PROBE: NOT DETECTED
SARS-COV-2 RNA RESP QL NAA+PROBE: NEGATIVE

## 2025-03-09 PROCEDURE — 99213 OFFICE O/P EST LOW 20 MIN: CPT | Performed by: NURSE PRACTITIONER

## 2025-03-09 PROCEDURE — 87651 STREP A DNA AMP PROBE: CPT | Performed by: NURSE PRACTITIONER

## 2025-03-09 PROCEDURE — 3078F DIAST BP <80 MM HG: CPT | Performed by: NURSE PRACTITIONER

## 2025-03-09 PROCEDURE — 3074F SYST BP LT 130 MM HG: CPT | Performed by: NURSE PRACTITIONER

## 2025-03-09 PROCEDURE — 0241U POCT CEPHEID COV-2, FLU A/B, RSV - PCR: CPT | Performed by: NURSE PRACTITIONER

## 2025-03-09 RX ORDER — METHYLPREDNISOLONE 4 MG/1
TABLET ORAL
Qty: 21 TABLET | Refills: 0 | Status: SHIPPED | OUTPATIENT
Start: 2025-03-09

## 2025-03-09 RX ORDER — BENZONATATE 100 MG/1
100 CAPSULE ORAL 3 TIMES DAILY PRN
Qty: 60 CAPSULE | Refills: 0 | Status: SHIPPED | OUTPATIENT
Start: 2025-03-09

## 2025-03-09 NOTE — LETTER
March 9, 2025         Patient: Tanisha Cole   YOB: 1995   Date of Visit: 3/9/2025           To Whom it May Concern:    Tanisha Cole was seen in my clinic on 3/9/2025. She may return to work on 3/11/25.    If you have any questions or concerns, please don't hesitate to call.        Sincerely,           ANGÉLICA Montejo.  Electronically Signed

## 2025-03-10 ASSESSMENT — ENCOUNTER SYMPTOMS
NAUSEA: 0
COUGH: 1
SORE THROAT: 0
HEADACHES: 0
CHILLS: 0
WHEEZING: 0
FEVER: 0
RHINORRHEA: 1

## 2025-03-10 NOTE — PROGRESS NOTES
Subjective:   Tanisha Cole is a 29 y.o. female who presents for Cough (X3 days fever, chills, body aches, sore throat , fatigue, nausea,and loss of appetite.)      URI   This is a new problem. Episode onset: 3 days. The problem has been gradually worsening. Associated symptoms include congestion, coughing and rhinorrhea. Pertinent negatives include no headaches, nausea, plugged ear sensation, sore throat or wheezing. She has tried acetaminophen for the symptoms.       Review of Systems   Constitutional:  Positive for malaise/fatigue. Negative for chills and fever.   HENT:  Positive for congestion and rhinorrhea. Negative for sore throat.    Respiratory:  Positive for cough. Negative for wheezing.    Gastrointestinal:  Negative for nausea.   Neurological:  Negative for headaches.       Medications:    benzonatate Caps  methylPREDNISolone Tbpk    Allergies: Penicillins    Problem List: Tanisha Cole does not have any pertinent problems on file.    Surgical History:  Past Surgical History:   Procedure Laterality Date    [place] LAP,DIAGNOSTIC ABDOMEN  3/15/2024    Procedure: PELVIC EXAM UNDER ANESTHESIA, PELVISCOPY, LYSIS OF ADHESIONS,, DIAGNOSTIC AND OPERATIVE HYSTEROSCOPY, RIGHT PARATUBAL CYST EXCISION, EXCISION OF FIBROID, ENDOMETRIAL CURETTAGE WITH OSCILLATING CUTTING BLADES, CYSTOSCOPY WITH BLADDER DILATION, PLACEMENT OF SURGICEL;  Surgeon: Liliam Conte M.D.;  Location: SURGERY SAME DAY [facility];  Service: Gynecology    HYSTEROSCOPY WITH MYOSURE  3/15/2024    Procedure: HYSTEROSCOPY, WITH TISSUE REMOVAL, USING HYSTEROSCOPIC ROTATING CUTTER BLADE;  Surgeon: Liliam Conte M.D.;  Location: SURGERY SAME DAY [facility];  Service: Gynecology       Past Social Hx: Tanisha Cole  reports that she has never smoked. She has been exposed to tobacco smoke. She has never used smokeless tobacco. She reports that she does not currently use alcohol. She reports that she does not use drugs.     Past  "Family Hx:  Tanisha Cole family history includes Breast Cancer in her paternal grandmother; Breast Cancer (age of onset: 32) in her maternal aunt; Hypertension in her father; No Known Problems in her brother and sister.     Problem list, medications, and allergies reviewed by myself today in Epic.     Objective:     /72   Pulse 86   Temp 36.9 °C (98.4 °F) (Temporal)   Resp 12   Ht 1.702 m (5' 7\")   Wt 93.2 kg (205 lb 8 oz)   LMP 01/19/2025 (Exact Date)   SpO2 98%   BMI 32.19 kg/m²     Physical Exam  Vitals and nursing note reviewed.   Constitutional:       General: She is not in acute distress.     Appearance: She is well-developed.   HENT:      Head: Normocephalic and atraumatic.      Right Ear: Tympanic membrane and external ear normal.      Left Ear: Tympanic membrane and external ear normal.      Nose: Nose normal.      Right Sinus: No maxillary sinus tenderness or frontal sinus tenderness.      Left Sinus: No maxillary sinus tenderness or frontal sinus tenderness.      Mouth/Throat:      Mouth: Mucous membranes are moist.      Pharynx: Uvula midline. No posterior oropharyngeal erythema.      Tonsils: No tonsillar exudate or tonsillar abscesses.   Eyes:      General:         Right eye: No discharge.         Left eye: No discharge.      Conjunctiva/sclera: Conjunctivae normal.   Cardiovascular:      Rate and Rhythm: Normal rate.   Pulmonary:      Effort: Pulmonary effort is normal. No respiratory distress.      Breath sounds: Normal breath sounds.   Abdominal:      General: There is no distension.   Musculoskeletal:         General: Normal range of motion.   Skin:     General: Skin is warm and dry.   Neurological:      General: No focal deficit present.      Mental Status: She is alert and oriented to person, place, and time. Mental status is at baseline.      Gait: Gait (gait at baseline) normal.   Psychiatric:         Judgment: Judgment normal.         Assessment/Plan:     Diagnosis and " associated orders:     1. Pharyngitis, unspecified etiology  POCT GROUP A STREP, PCR    POCT CoV-2, Flu A/B, RSV by PCR    benzonatate (TESSALON) 100 MG Cap    methylPREDNISolone (MEDROL DOSEPAK) 4 MG Tablet Therapy Pack      2. Influenza A  benzonatate (TESSALON) 100 MG Cap    methylPREDNISolone (MEDROL DOSEPAK) 4 MG Tablet Therapy Pack         Comments/MDM:   Results for orders placed or performed in visit on 03/09/25   POCT GROUP A STREP, PCR    Collection Time: 03/09/25  8:16 PM   Result Value Ref Range    POC Group A Strep, PCR Not Detected Not Detected, Invalid   POCT CoV-2, Flu A/B, RSV by PCR    Collection Time: 03/09/25  8:16 PM   Result Value Ref Range    SARS-CoV-2 by PCR Negative Negative, Invalid    Influenza virus A RNA Positive (A) Negative, Invalid    Influenza virus B, PCR Negative Negative, Invalid    RSV, PCR Negative Negative, Invalid       It was explained today that due to the viral nature of the pt's illness, we will treat symptomatically today.   No evidence of bacterial infection on exam. Lungs are clear, Sats are WNL, TMs clear, neck is supple, pt. Is well appearing- non-toxic.   Encouraged OTC supportive meds PRN. Humidification, increase fluids,   Discussed side effects of OTC meds and any prescribed.  Given precautionary s/sx that mandate immediate follow up and evaluation in the ED. Advised of risks of not doing so.    DDX, Supportive care, and indications for immediate follow-up discussed with patient.    Instructed to return to clinic or nearest emergency department if we are not available for any change in condition, further concerns, or worsening of symptoms.    The patient  and/or guardian demonstrated a good understanding and agreed with the treatment plan.               Please note that this dictation was created using voice recognition software. I have made a reasonable attempt to correct obvious errors, but I expect that there are errors of grammar and possibly content that I  did not discover before finalizing the note.    This note was electronically signed by Redd GOLD.

## (undated) DEVICE — GOWN SURGEONS X-LARGE - DISP. (30/CA)

## (undated) DEVICE — Device

## (undated) DEVICE — SUTURE 4-0 MONOCRYL PLUS PS-2 - 27 INCH (36/BX)

## (undated) DEVICE — CLOSURE SKIN STRIP 1/2 X 4 IN - (STERI STRIP) (50/BX 4BX/CA)

## (undated) DEVICE — SPONGE GAUZESTER. 2X2 4-PL - (2/PK 50PK/BX 30BX/CS)

## (undated) DEVICE — DRESSING TRANSPARENT FILM TEGADERM 2.375 X 2.75"  (100EA/BX)"

## (undated) DEVICE — GLOVE BIOGEL PI INDICATOR SZ 7.0 SURGICAL PF LF - (50/BX 4BX/CA)

## (undated) DEVICE — SET SUCTION/IRRIGATION WITH DISPOSABLE TIP (6/CA )PART #0250-070-520 IS A SUB

## (undated) DEVICE — TRAY FOLEY CATHETER STATLOCK 16FR SURESTEP  (10EA/CA)

## (undated) DEVICE — CANNULA O2 COMFORT SOFT EAR ADULT 7 FT TUBING (50/CA)

## (undated) DEVICE — SPECIMEN PLASTIC CONVERTOR - (10/CA)

## (undated) DEVICE — SLEEVE VASO CALF MED - (10PR/CA)

## (undated) DEVICE — APPLICATOR ENDOSCOPIC SURGICEL (5EA/BX)

## (undated) DEVICE — CANISTER SUCTION 3000ML MECHANICAL FILTER AUTO SHUTOFF MEDI-VAC NONSTERILE LF DISP  (40EA/CA)

## (undated) DEVICE — GLOVE BIOGEL INDICATOR SZ 7SURGICAL PF LTX - (50/BX 4BX/CA)

## (undated) DEVICE — SODIUM CHL. IRRIGATION 0.9% 3000ML (4EA/CA 65CA/PF)

## (undated) DEVICE — TOWEL STOP TIMEOUT SAFETY FLAG (40EA/CA)

## (undated) DEVICE — SET TUBING AQUILEX OUT-FLOW MYOSURE (10EA/BX)

## (undated) DEVICE — GLOVE BIOGEL PI INDICATOR SZ 6.5 SURGICAL PF LF - (50/BX 4BX/CA)

## (undated) DEVICE — GLOVE SZ 6 BIOGEL PI MICRO - PF LF (50PR/BX 4BX/CA)

## (undated) DEVICE — TROCAR LAPSCP 100MM 12MM NTHRD - (6/BX)

## (undated) DEVICE — GLOVE BIOGEL SZ 6.5 SURGICAL PF LTX (50PR/BX 4BX/CA)

## (undated) DEVICE — SET TUBING AQUILEX IN-FLOW MYOSURE (10EA/BX)

## (undated) DEVICE — ADHESIVE MASTISOL - (48/BX)

## (undated) DEVICE — CANISTER SUCTION RIGID RED 1500CC (40EA/CA)

## (undated) DEVICE — SUTURE 0 VICRYL PLUS CT-2 - 27 INCH (36/BX)

## (undated) DEVICE — SUTURE GENERAL

## (undated) DEVICE — SODIUM CHL IRRIGATION 0.9% 1000ML (12EA/CA)

## (undated) DEVICE — UTERINE MANIP RUMI 6.7X6 - (5/BX)

## (undated) DEVICE — LACTATED RINGERS INJ 1000 ML - (14EA/CA 60CA/PF)

## (undated) DEVICE — DEVICE REMOVAL MYOSURE LITE TISSUE (3EA/BX)

## (undated) DEVICE — SENSOR OXIMETER ADULT SPO2 RD SET (20EA/BX)

## (undated) DEVICE — SET IRRIGATION CYSTOSCOPY TUBE L80 IN (20EA/CA)

## (undated) DEVICE — TUBE CONNECTING SUCTION - CLEAR PLASTIC STERILE 72 IN (50EA/CA)

## (undated) DEVICE — SET LEADWIRE 5 LEAD BEDSIDE DISPOSABLE ECG (1SET OF 5/EA)

## (undated) DEVICE — PAD SANITARY 11IN MAXI IND WRAPPED  (12EA/PK 24PK/CA)

## (undated) DEVICE — HEMOSTAT ABSORBABLE POWDER SURGICEL 3G (5EA/BX)

## (undated) DEVICE — SUCTION INSTRUMENT YANKAUER BULBOUS TIP W/O VENT (50EA/CA)

## (undated) DEVICE — KIT  I.V. START (100EA/CA)

## (undated) DEVICE — MASK OXYGEN VNYL ADLT MED CONC WITH 7 FOOT TUBING  - (50EA/CA)

## (undated) DEVICE — SET TUBING PNEUMOCLEAR HIGH FLOW SMOKE EVACUATION (10EA/BX)

## (undated) DEVICE — DRAPESURG STERI-DRAPE LONG - (10/BX 4BX/CA)

## (undated) DEVICE — GOWN WARMING STANDARD FLEX - (30/CA)

## (undated) DEVICE — TUBING CLEARLINK DUO-VENT - C-FLO (48EA/CA)

## (undated) DEVICE — GLOVE SZ 7.5 BIOGEL PI MICRO - PF LF (50PR/BX)

## (undated) DEVICE — PACK TRENGUARD 450 PROCEDURE (12EA/CA)

## (undated) DEVICE — TROCAR 5X100 BLADED ADVANCE - FIXATION (6/BX)

## (undated) DEVICE — TUBE E-T HI-LO CUFF 6.5MM (10EA/BX)